# Patient Record
Sex: MALE | Employment: OTHER | URBAN - METROPOLITAN AREA
[De-identification: names, ages, dates, MRNs, and addresses within clinical notes are randomized per-mention and may not be internally consistent; named-entity substitution may affect disease eponyms.]

---

## 2018-12-05 ENCOUNTER — HOSPITAL ENCOUNTER (OUTPATIENT)
Age: 33
Discharge: HOME | End: 2018-12-05
Attending: FAMILY MEDICINE
Payer: OTHER GOVERNMENT

## 2018-12-05 DIAGNOSIS — M54.50 LOW BACK PAIN: ICD-10-CM

## 2018-12-05 PROCEDURE — 72148 MRI LUMBAR SPINE W/O DYE: CPT

## 2019-01-28 ENCOUNTER — OFFICE VISIT (OUTPATIENT)
Dept: ORTHOPEDIC SURGERY | Age: 34
End: 2019-01-28

## 2019-01-28 VITALS
DIASTOLIC BLOOD PRESSURE: 84 MMHG | HEART RATE: 80 BPM | WEIGHT: 200 LBS | SYSTOLIC BLOOD PRESSURE: 129 MMHG | HEIGHT: 67 IN | BODY MASS INDEX: 31.39 KG/M2

## 2019-01-28 DIAGNOSIS — M54.59 MECHANICAL LOW BACK PAIN: Primary | ICD-10-CM

## 2019-01-28 DIAGNOSIS — M79.18 MYOFASCIAL PAIN: ICD-10-CM

## 2019-01-28 NOTE — PATIENT INSTRUCTIONS
Back Stretches: Exercises  Your Care Instructions  Here are some examples of exercises for stretching your back. Start each exercise slowly. Ease off the exercise if you start to have pain. Your doctor or physical therapist will tell you when you can start these exercises and which ones will work best for you. How to do the exercises  Overhead stretch    1. Stand comfortably with your feet shoulder-width apart. 2. Looking straight ahead, raise both arms over your head and reach toward the ceiling. Do not allow your head to tilt back. 3. Hold for 15 to 30 seconds, then lower your arms to your sides. 4. Repeat 2 to 4 times. Side stretch    1. Stand comfortably with your feet shoulder-width apart. 2. Raise one arm over your head, and then lean to the other side. 3. Slide your hand down your leg as you let the weight of your arm gently stretch your side muscles. Hold for 15 to 30 seconds. 4. Repeat 2 to 4 times on each side. Press-up    1. Lie on your stomach, supporting your body with your forearms. 2. Press your elbows down into the floor to raise your upper back. As you do this, relax your stomach muscles and allow your back to arch without using your back muscles. As your press up, do not let your hips or pelvis come off the floor. 3. Hold for 15 to 30 seconds, then relax. 4. Repeat 2 to 4 times. Relax and rest    1. Lie on your back with a rolled towel under your neck and a pillow under your knees. Extend your arms comfortably to your sides. 2. Relax and breathe normally. 3. Remain in this position for about 10 minutes. 4. If you can, do this 2 or 3 times each day. Follow-up care is a key part of your treatment and safety. Be sure to make and go to all appointments, and call your doctor if you are having problems. It's also a good idea to know your test results and keep a list of the medicines you take. Where can you learn more?   Go to http://yesenia-handy.info/. Enter W093 in the search box to learn more about \"Back Stretches: Exercises. \"  Current as of: September 20, 2018  Content Version: 11.9  © 9398-3130 [employer]. Care instructions adapted under license by [employer] (which disclaims liability or warranty for this information). If you have questions about a medical condition or this instruction, always ask your healthcare professional. [address] any warranty or liability for your use of this information. Therapeutic Ball: Back Exercises  Your Care Instructions  Here are some examples of typical exercises for your condition. Start each exercise slowly. Ease off the exercise if you start to have pain. Your doctor or physical therapist will tell you when you can start these exercises and which ones will work best for you. To prepare, make sure that your ball is the right size for you. When inflated and firm, it should allow you to sit with your hips and knees bent at about a 90-degree angle (like the letter L). How to do the exercises  Seated position on ball    1. Use this exercise to get used to moving on the ball and to find your best sitting position. 2. Sit comfortably on the ball with your feet about hip-width apart. If you feel unsteady, rest your hands on the ball near your hips. 3. As you do this exercise, try to keep your shoulders and upper body relaxed and still. 4. Using your stomach and back muscles to move your pelvis, roll the ball forward. This will round your back. 5. Still using your stomach and back muscles, roll the ball back. You will arch your back. 6. Repeat this rounding-arching motion a few times. 7. Stop in between the two positions, where your back is not rounded or arched. This is called your neutral position. Pelvic rotation    1. Sit tall on the ball. 2. Slowly rotate your hips in a [place] pattern.  Keep the movement focused at your hips. 3. Repeat, but [place] in the other direction. 4. Repeat 8 to 12 times. Postural sitting    1. Use this position to find a stable, relaxed posture on the ball. You can use this position as your starting point for other ball exercises. If you feel unsteady on the ball, start on a chair first.  2. Sit on a ball or chair, with your feet planted straight in front of you. 3. Imagine that a string at the top of your head is pulling you straight up. Think of yourself as 2 inches taller than you are.  4. Slightly tuck your chin. 5. Keep your shoulders back and relaxed. Knee extension    1. Sit tall on the ball with your feet planted in front of you, hip-width apart. As you do this exercise, avoid slumping your shoulders and arching your back. 2. Rest your hands on the ball near your hip or a steady object next to you. (If you feel very stable on the ball, rest your hands in your lap or at your side.)  3. Slowly straighten one leg at the knee. Slowly lower it back down. Repeat with the other leg. 4. Repeat this exercise 8 to 12 times. Roll-ups    1. Lie on your back with your knees bent, feet resting on the floor. 2. Lay the ball on your thighs. Rest your hands up high on the ball. 3. Raising your head and shoulder blades, roll the ball up your thighs. Exhale as you roll up. 4. If this is hard on your neck, gently support your lower head and upper neck with one hand. Don't use that hand to pull your head up. 5. Repeat 8 to 12 times. Ball curls    1. Lie on your back with your ankles resting on the ball, knees straight. 2. Use your legs to roll the exercise ball toward you. Allow your knees to bend and move closer to your chest.  3. Pause briefly, and then roll the ball to the starting position. Try to keep the ball rolling straight. You will feel the muscles in your lower belly working. 4. Repeat 8 to 12 times. Bridge with ball under legs    1.  Lie on your back with your legs up, calves resting on the ball. For more challenge, rest your heels on the ball. 2. Look up at the ceiling, and keep your chin relaxed. You can place a small pillow under your head or neck for comfort. 3. With your arms by your side, press your hands onto the floor for stability. 4. Tighten your belly muscles by pulling in your belly button toward your spine. 5. Push your heels down toward the floor, squeeze your buttocks, and lift your hips off the floor until your shoulders, hips, and knees are all in a straight line. 6. Try to keep the ball steady. Hold for about 6 seconds as you continue to breathe normally. 7. Slowly lower your hips back down to the floor. 8. Repeat 8 to 12 times. Ball curls with bridge    1. Start flat on your back with your ankles resting on the ball. 2. Look up at the ceiling, and keep your chin relaxed. You can place a small pillow under your head or neck for comfort. 3. With your arms by your side, press your hands onto the floor for stability. 4. Tighten your belly muscles by pulling in your belly button toward your spine. 5. Push your heels down toward the floor, squeeze your buttocks, and lift your hips off the floor until your shoulders, hips, and knees are all in a straight line. 6. While holding the bridge position, roll the ball toward you with your heels. Keep your hips as level as you can. 7. Pause briefly, and then roll the ball back out. Try to keep the ball rolling straight. You will feel the muscles in your lower belly working as you straighten your legs. 8. Lower your hips, and return to your starting position. 9. Repeat 8 to 12 times. 10. When you can keep your body and the ball steady throughout this exercise, you're ready for more challenge. Try keeping your hips raised while rolling the ball out, holding the bridge, and rolling back, a few times in a row. Praying troy    1. Kneel upright with the ball in front of you.   2. To start, clasp your hands together. Rest them on the ball in front of you. 3. As you do this exercise, keep your back and hips straight and tighten your belly and buttocks muscles. Keep your knees in place. 4. Press on the ball with your arms. Lean forward from the knees. This rolls the ball forward. You will bear most of your weight on your arms. 5. If your back starts to ache, you've gone too far. Pull back a bit. 6. Roll back to the start position. 7. Repeat 8 to 12 times. Walk-out plank on ball    1. Kneel over the ball. Place your hands on the floor in front of you. 2. Walk your hands forward until your legs are straight on the ball. This is the plank position. 3. When in plank position, hold your body straight and tighten your belly and buttocks muscles. Keep your chin slightly tucked. 4. Roll as far forward as you can without losing your balance or letting your hips drop. You may stop with the ball under your thighs, or even under your knees or shins. 5. Hold a few seconds, then walk your hands back and return to the start position. 6. Repeat 8 to 12 times. Push-up with thighs on ball    1. Kneel over the ball. Place your hands on the floor in front of you. 2. Walk your hands forward until your legs are straight on the ball. This is the plank position. 3. When in plank position, hold your body straight and tighten your belly and buttocks muscles. Keep your chin slightly tucked. 4. Roll as far forward as you can without losing your balance or letting your hips drop. You may stop with the ball under your thighs, or even under your knees or shins. 5. Bend your elbows. Slowly lower your body toward the ground as far as you can without losing your balance. 6. If your wrists hurt, try moving your hands a little farther apart so they're not right under your shoulders. 7. Slowly straighten your arms. 8. Do 8 to 12 of these push-ups. Wall squat with ball    1. Stand facing away from a wall.  Place your feet about shoulder-width apart. 2. Place the ball between your middle back and the wall. Move your feet out in front of you so they are about a foot in front of your hips. 3. Keep your arms at your sides, or put your hands on your hips. 4. Slowly squat down as if you are going to sit in a chair, rolling your back over the ball as you squat. The ball should move with you but stay pressed into the wall. 5. Be sure that your knees do not go in front of your toes as you squat. 6. Hold for 6 seconds. 7. Slowly rise to your standing position. 8. Repeat 8 to 12 times. Child's pose with ball    1. Kneeling upright with your back straight, rest your hands on the ball in front of you. 2. Breathe out as you bend at the hips, and roll the ball forward. Lower your chest toward the ground, and drop your hips back toward your heels. 3. To stretch your upper back and shoulders, hold this position for 15 to 30 seconds. 4. Repeat 2 to 4 times. Follow-up care is a key part of your treatment and safety. Be sure to make and go to all appointments, and call your doctor if you are having problems. It's also a good idea to know your test results and keep a list of the medicines you take. Where can you learn more? Go to http://yesenia-handy.info/. Enter M117 in the search box to learn more about \"Therapeutic Ball: Back Exercises. \"  Current as of: September 20, 2018  Content Version: 11.9  © 7711-7821 [employer]. Care instructions adapted under license by [employer] (which disclaims liability or warranty for this information). If you have questions about a medical condition or this instruction, always ask your healthcare professional. [address] any warranty or liability for your use of this information. Low Back Pain: Exercises  Your Care Instructions  Here are some examples of typical rehabilitation exercises for your condition. Start each exercise slowly. Ease off the exercise if you start to have pain. Your doctor or physical therapist will tell you when you can start these exercises and which ones will work best for you. How to do the exercises  Press-up    1. Lie on your stomach, supporting your body with your forearms. 2. Press your elbows down into the floor to raise your upper back. As you do this, relax your stomach muscles and allow your back to arch without using your back muscles. As your press up, do not let your hips or pelvis come off the floor. 3. Hold for 15 to 30 seconds, then relax. 4. Repeat 2 to 4 times. Alternate arm and leg (bird dog) exercise    1. Start on the floor, on your hands and knees. 2. Tighten your belly muscles. 3. Raise one leg off the floor, and hold it straight out behind you. Be careful not to let your hip drop down, because that will twist your trunk. 4. Hold for about 6 seconds, then lower your leg and switch to the other leg. 5. Repeat 8 to 12 times on each leg. 6. Over time, work up to holding for 10 to 30 seconds each time. 7. If you feel stable and secure with your leg raised, try raising the opposite arm straight out in front of you at the same time. Knee-to-chest exercise    1. Lie on your back with your knees bent and your feet flat on the floor. 2. Bring one knee to your chest, keeping the other foot flat on the floor (or keeping the other leg straight, whichever feels better on your lower back). 3. Keep your lower back pressed to the floor. Hold for at least 15 to 30 seconds. 4. Relax, and lower the knee to the starting position. 5. Repeat with the other leg. Repeat 2 to 4 times with each leg. 6. To get more stretch, put your other leg flat on the floor while pulling your knee to your chest.    Curl-ups    1. Lie on the floor on your back with your knees bent at a 90-degree angle. Your feet should be flat on the floor, about 12 inches from your buttocks.   2. Cross your arms over your chest. If this bothers your neck, try putting your hands behind your neck (not your head), with your elbows spread apart. 3. Slowly tighten your belly muscles and raise your shoulder blades off the floor. 4. Keep your head in line with your body, and do not press your chin to your chest.  5. Hold this position for 1 or 2 seconds, then slowly lower yourself back down to the floor. 6. Repeat 8 to 12 times. Pelvic tilt exercise    1. Lie on your back with your knees bent. 2. \"Brace\" your stomach. This means to tighten your muscles by pulling in and imagining your belly button moving toward your spine. You should feel like your back is pressing to the floor and your hips and pelvis are rocking back. 3. Hold for about 6 seconds while you breathe smoothly. 4. Repeat 8 to 12 times. Heel dig bridging    1. Lie on your back with both knees bent and your ankles bent so that only your heels are digging into the floor. Your knees should be bent about 90 degrees. 2. Then push your heels into the floor, squeeze your buttocks, and lift your hips off the floor until your shoulders, hips, and knees are all in a straight line. 3. Hold for about 6 seconds as you continue to breathe normally, and then slowly lower your hips back down to the floor and rest for up to 10 seconds. 4. Do 8 to 12 repetitions. Hamstring stretch in doorway    1. Lie on your back in a doorway, with one leg through the open door. 2. Slide your leg up the wall to straighten your knee. You should feel a gentle stretch down the back of your leg. 3. Hold the stretch for at least 15 to 30 seconds. Do not arch your back, point your toes, or bend either knee. Keep one heel touching the floor and the other heel touching the wall. 4. Repeat with your other leg. 5. Do 2 to 4 times for each leg. Hip flexor stretch    1. Kneel on the floor with one knee bent and one leg behind you. Place your forward knee over your foot.  Keep your other knee touching the floor.  2. Slowly push your hips forward until you feel a stretch in the upper thigh of your rear leg. 3. Hold the stretch for at least 15 to 30 seconds. Repeat with your other leg. 4. Do 2 to 4 times on each side. Wall sit    1. Stand with your back 10 to 12 inches away from a wall. 2. Lean into the wall until your back is flat against it. 3. Slowly slide down until your knees are slightly bent, pressing your lower back into the wall. 4. Hold for about 6 seconds, then slide back up the wall. 5. Repeat 8 to 12 times. Follow-up care is a key part of your treatment and safety. Be sure to make and go to all appointments, and call your doctor if you are having problems. It's also a good idea to know your test results and keep a list of the medicines you take. Where can you learn more? Go to http://yesenia-handy.info/. Enter Z441 in the search box to learn more about \"Low Back Pain: Exercises. \"  Current as of: September 20, 2018  Content Version: 11.9  © 4811-6368 [employer]. Care instructions adapted under license by [employer] (which disclaims liability or warranty for this information). If you have questions about a medical condition or this instruction, always ask your healthcare professional. [address] any warranty or liability for your use of this information.

## 2019-01-28 NOTE — PROGRESS NOTES
[address]  [address], [address]  [place]  Phone: [phone]  Fax: [phone]        Josi Peters  : 1985  PCP: Yogi Jeter MD  2019    NEW PATIENT      ASSESSMENT AND PLAN     Ken Payne comes in to the office today c/o low back pain following a fall at work when he slipped on some ice. He denies any radicular symptoms. He has been in PT for the last 5 weeks ([facility]), and he is unsure as to whether it has been beneficial. He rates his pain as a 0/10 today. His lumbar MRI revealed ambiguous segmentation with partially lumbarized L5. At the L5-S1 transitional level, there is mild left foraminal stenosis related to bony prominence of the left L5-S1 lateral mass psuedoarticulation complex. On examination, he maintains strength and sensation. He had tenderness to palpation of his lumbar paraspinals. He had no directional preference. His pain is likely myofascial in nature. I referred to PT with dry needling and pilates equipment based therapy. Pt will f/u in 6 weeks or sooner if needed. Diagnoses and all orders for this visit:    1. Mechanical low back pain  -     REFERRAL TO PHYSICAL THERAPY    2. Myofascial pain  -     REFERRAL TO PHYSICAL THERAPY      Follow-up Disposition: Not on File    CHIEF COMPLAINT  Ken Payne is seen today in consultation at the request of Other, MD Joseph for complaints of low back pain. HISTORY OF PRESENT ILLNESS  Ken Payne is a 35 y.o. male c/o low back pain. He works in the [employer]. He reports that he was walking outside of work one day (2018), and he slipped on some ice landing on his back. He reports that he has had low back pain since.  He is currently in PT ([facility] with Jerardo Denny) for about 5 weeks, and he is unsure whether it has been beneficial. His pain is worse after prolonged standing or sitting, but is alleviated with walking or moving. He is still able to run without pain. He continues to do the [employer] workout series, but it exacerbates his pain some. He has been using ice as a modality. Pt denies any fevers, chills, nausea, vomiting. Pt denies any chest pain and shortness of breath. Pt denies any ear, nose, and throat problems. Pt denies any fecal or urinary incontinence. PAST MEDICAL HISTORY   Past Medical History:   Diagnosis Date    Lactose intolerance        History reviewed. No pertinent surgical history. MEDICATIONS        ALLERGIES    Allergies   Allergen Reactions    Lactose Other (comments)     intolerance          SOCIAL HISTORY    Social History     Socioeconomic History    Marital status:      Spouse name: Not on file    Number of children: Not on file    Years of education: Not on file    Highest education level: Not on file       FAMILY HISTORY  History reviewed. No pertinent family history. REVIEW OF SYSTEMS  Review of Systems   Musculoskeletal: Positive for back pain. PHYSICAL EXAMINATION  Visit Vitals  /84   Pulse 80   Ht 5' 7\" (1.702 m)   Wt 200 lb (90.7 kg)   BMI 31.32 kg/m²         Pain Assessment  1/28/2019   Location of Pain Back   Severity of Pain 0   Quality of Pain Aching   Frequency of Pain Intermittent   Aggravating Factors Standing;Bending   Limiting Behavior No   Relieving Factors Rest   Result of Injury No         Constitutional:  Well developed, well nourished, in no acute distress. Psychiatric: Affect and mood are appropriate. HEENT: Normocephalic, atraumatic. Extraocular movements intact. Integumentary: No rashes or abrasions noted on exposed areas. Cardiovascular: Regular rate and rhythm. Pulmonary: Clear to auscultation bilaterally. SPINE/MUSCULOSKELETAL EXAM    Cervical spine:  Neck is midline. Normal muscle tone. No focal atrophy is noted. ROM pain free. Shoulder ROM intact. No tenderness to palpation. Negative Spurling's sign. Negative Tinel's sign. Negative Farah's sign. Sensation in the bilateral arms grossly intact to light touch. Lumbar spine:  No rash, ecchymosis, or gross obliquity. No fasciculations. No focal atrophy is noted. No pain with hip ROM. Full range of motion. Tenderness to palpation of lumbar paraspinals. No tenderness to palpation at the sciatic notch. SI joints non-tender. Trochanters non tender. Sensation in the bilateral legs grossly intact to light touch. No directional preference. MOTOR:      Biceps  Triceps Deltoids Wrist Ext Wrist Flex Hand Intrin   Right 5/5 5/5 5/5 5/5 5/5 5/5   Left 5/5 5/5 5/5 5/5 5/5 5/5             Hip Flex  Quads Hamstrings Ankle DF EHL Ankle PF   Right 5/5 5/5 5/5 5/5 5/5 5/5   Left 5/5 5/5 5/5 5/5 5/5 5/5     DTRs are 2+ biceps, triceps, brachioradialis, patella, and Achilles. Negative Straight Leg raise. Squat not tested. No difficulty with tandem gait. Ambulation without assistive device. FWB. RADIOGRAPHS  Lumbar MRI images taken on 12/5/18 personally reviewed with patient:  FINDINGS:     General: Ambiguous vertebral segmentation. For interpretation purposes, L5 will  be considered partially sacralized, left more than right with pseudoarticulation  of the left L5-S1 lateral masses. Vertebral body heights are preserved. Above  L5, disc space heights are preserved. No focal listhesis. Lumbar lordosis  maintained. Conus ends at level L1. No abnormal signal in the distal cord. No  abnormal epidural collection identified. No suspicious marrow signal.     Miscellaneous: No incidental acute or suspicious findings outside of the lumbar  spine.     Levels:     T11-T12: Evaluated sagittal plane only.  No significant degenerative change or  stenosis.     T12-L1: No significant degenerative change or stenosis.     L1-L2: No significant degenerative change or stenosis.     L2-L3: No significant degenerative change or stenosis.     L3-L4: No significant degenerative change or stenosis.     L4-L5: Minimal disc bulge. Mild facet arthropathy. Spinal canal patent. Minimal  foraminal narrowing.     L5-S1: Transitional level. No significant disc disease. Spinal canal patent. Mild left foraminal stenosis related to bony prominence of the left L5-S1  lateral mass pseudoarticulation. Right foramen patent.     Imaged sacrum: Unremarkable.     IMPRESSION  IMPRESSION:     1. No acute findings. No critical stenosis. Overall mild degenerative changes  detailed level by level above.     2. Ambiguous vertebral segmentation. L5 is considered partially lumbarized for  interpretation purposes. -At the L5-S1 transitional level there is mild left foraminal stenosis related  to bony prominence of the left L5-S1 lateral mass pseudoarticulation complex.  reviewed    Mr. Terry Kaplan has a reminder for a \"due or due soon\" health maintenance. I have asked that he contact his primary care provider for follow-up on this health maintenance. 30 minutes of face-to-face contact were spent with the patient during today's visit extensively discussing symptoms and treatment plan. All questions were answered. More than half of this visit today was spent on counseling. Written by Rk Cooper, as dictated by Dr. Marty Yip. I, Dr. Maryt Yip, confirm that all documentation is accurate.

## 2019-02-11 ENCOUNTER — HOSPITAL ENCOUNTER (OUTPATIENT)
Dept: PHYSICAL THERAPY | Age: 34
Discharge: HOME | End: 2019-02-11
Payer: OTHER GOVERNMENT

## 2019-02-11 PROCEDURE — 97535 SELF CARE MNGMENT TRAINING: CPT

## 2019-02-11 PROCEDURE — 97161 PT EVAL LOW COMPLEX 20 MIN: CPT

## 2019-02-11 PROCEDURE — 97110 THERAPEUTIC EXERCISES: CPT

## 2019-02-11 NOTE — PROGRESS NOTES
PT DAILY TREATMENT NOTE/LUMBAR EVAL 10-18 Patient Name: Juanjo Link Date:2019 : 1985 [x]  Patient  Verified Payor: MARCOS / Plan: Nguyễn Valerio 74 / Product Type: Shawna Oconnor / In time:2:05pm  Out time:2:43pm 
Total Treatment Time (min): 38 Visit #: 1 of 8 Medicare/BCBS Only Total Timed Codes (min):  20 1:1 Treatment Time:  45 Treatment Area: Low back pain [M54.5] SUBJECTIVE Pain Level (0-10 scale): 0 
[]constant [x]intermittent []improving []worsening []no change since onset Any medication changes, allergies to medications, adverse drug reactions, diagnosis change, or new procedure performed?: [x] No    [] Yes (see summary sheet for update) Subjective functional status/changes:    
Pt reports onset of back pain in 2018 after a slip and fall on ice. He reports he first f/u for treatment in 2018 d/t c/o continued intermittent back spasms and pain with prolonged static positioning, bending over sometimes such as when doing dishes, and when performing more intense workouts that involved jumping. He reports 5 weeks of PT treatment recently at another clinic that he feels did little to change his symptoms and \"felt too easy\". PLOF: exercising, lifting, running regularly without limitations Limitations to PLOF: continues to participate in exercise, but some limitations with harder workouts and running/jumping 2/2 back pain, pain with prolonged bending Mechanism of Injury: see above Current symptoms/Complaints: see above Previous Treatment/Compliance: PT 5 weeks, spine specialist referral 
PMHx/Surgical Hx: unremarkable Work Hx: see intake Living Situation:  
Pt Goals: see intake Barriers: []pain []financial []time []transportation []other Motivation: appears motivated Substance use: []Alcohol []Tobacco []other:  
FABQ Score: []low []elevate Cognition: A & O x 4    Other: OBJECTIVE/EXAMINATION Domestic Life: Activity/Recreational Limitations:  
Mobility:  
Self Care:  
 
Eval: 18 minutes 12 min Therapeutic Exercise:  [x] See flow sheet :  
Rationale: increase ROM and increase strength to improve the patients ability to improve ease of ADLs. Self Care: 8 minutes pt education regarding relevant anatomy, diagnosis, prognosis, plan of care, activity modification, self modality use to facilitate pt self management. With 
 [] TE 
 [] TA 
 [] neuro 
 [] other: Patient Education: [x] Review HEP [] Progressed/Changed HEP based on:  
[] positioning   [] body mechanics   [] transfers   [] heat/ice application   
[] other:   
 
Other Objective/Functional Measures: 
 
Physical Therapy Evaluation - Lumbar Spine ([facility]) SUBJECTIVE Chief Complaint: 
 
Mechanism of injury: 
 
Symptoms: 
Aggravated by: 
 [x] Bending [] Sitting [] Standing [] Walking 
 [] Moving [] Cough [] Sneeze [] Valsalva 
 [] AM  [] PM  Lying:  [] sup   [] pro   [] sidelying 
 [x] Other: running, jumping Eased by:  
 [] Bending [] Sitting [] Standing [] Walking 
 [] Moving [] AM  [] PM  Lying: [] sup  [] pro  [] sidelying 
 [] Other:   
  
General Health:  Red Flags Indicated? [] Yes    [x] No 
[] Yes [] No Recent weight change (If yes, due to dieting? [] Yes  [] No) [] Yes [] No Weakness in legs during walking 
[] Yes [] No Unremitting pain at night 
[] Yes [] No Abdominal pain or problems 
[] Yes [] No Rectal bleeding 
[] Yes [] No Feet more cold or painful in cold weather 
[] Yes [] No Menstrual irregularities 
[] Yes [] No Blood or pain with urination 
[] Yes [] No Dysfunction of bowel or bladder 
[] Yes [] No Recent illness within past 3 weeks (i.e, cold, flu) 
[] Yes [] No Numbness/tingling in buttock/genitalia region Past History/Treatments:  
 
Diagnostic Tests: [] Lab work [] X-rays    [] CT [x] MRI     [] Other: 
Results: see results in Epic, unremarkable Functional Status Prior level of function: Present functional limitations: 
What position do you sleep in?: 
 
OBJECTIVEPosture: 
Lateral Shift: [] R    [] L     [] +  [] -   
Kyphosis: [] Increased [] Decreased   []  WNL Lordosis:  [] Increased [] Decreased   [] WNL Pelvic symmetry: [] WNL    [] Other: 
 
Gait:  [x] Normal     [] Abnormal: 
 
Active Movements: [] N/A   [] Too acute   [] Other: ROM % AROM % PROM Comments:pain, area Forward flexion 40-60 Fingers to toes  Minimal pain if flexion held in mid range Extension 20-30 WNL  Minimal LBP at end range SB right 20-30 WNL    
SB left 20-30 WNL Rotation right 5-10 WNL Rotation left 5-10 WNL Repeated Movements Effects on present pain: produces (NY), abolishes (A), increases (incr), decreases (decr), centralizes (C), peripheral (PH), no effect (NE) Pre-Test Sx Flexion Repeated Flexion Extension Repeated Extension Repeated SBL Repeated SBR Sitting Standing Lying      N/A N/A Comments: 
Side Glide: 
Sustained passive positioning test: 
 
Neuro Screen [x] WNL Myotome/Dermatome/Reflexes: 
Comments: 
 
Dural Mobility: SLR Sitting: [] R    [] L    [] +    [] -  @ (degrees):  
        Supine: [] R    [] L    [] +    [x] -  @ (degrees):  
Slump Test: [] R    [] L    [] +    [x] -  @ (degrees): Prone Knee Bend: [] R    [] L    [] +    [x] - Palpation 
[x] Min  [] Mod  [] Severe    Location: R thoracolumbar paraspinals from ~T10-L4 [] Min  [] Mod  [] Severe    Location: 
[] Min  [] Mod  [] Severe    Location: 
 
Strength 
 L(0-5) R (0-5) N/T Hip Flexion (L1,2) 5 5 [] Knee Extension (L3,4) 5 5 [] Ankle Dorsiflexion (L4) 5 5 [] Great Toe Extension (L5) 5 5 [] Ankle Plantarflexion (S1) 5 5 [] Knee Flexion (S1,2) 5 5 [] Upper Abdominals   [] Lower Abdominals   [] Paraspinals 4 4 [] Back Rotators   [] Gluteus Fidencio 4 4 [] Other   [] Special Tests Lumbar:  Lumb.  Compression: [] Pos  [] Neg            
 Lumbar Distraction:   [] Pos  [] Neg 
  Quadrant:  [] Pos  [] Neg   [] Flex  [] Ext Sacroilliac:  Gaenslen's: [] R    [] L    [] +    [] -  
  Compression: [] +    [x] - Gapping:  [] +    [x] - Thigh Thrust: [] R    [] L    [] +    [] - Leg Length: [] +    [x] -   Position: 
  Crests:  level ASIS: level PSIS: level Sacral Sulcus: 
  Mobility: Standing flex: 
   Sitting flex: 
   Supine to sit: 
   Prone knee bend: 
 
     Hip: Huyen Chanell:  [x] R    [x] L    [x] +    [] - limited hip mobility bilat, no back pain Scour:  [] R    [] L    [] +    [x] - Piriformis: [] R    [] L    [] +    [x] - Deficits: Kyle's: [] R    [] L    [] +    [x] - William: [x] R    [x] L    [x] +    [] - Hamstrings 90/90: left -25, right -15 Gastrocsoleus (to neutral): Right: Left: 
    
Global Muscular Weakness: 
Abdominals: 
Quadratus Lumborum: 
Paraspinals: Other: 
 
Other tests/comments: 
  
 
Pain Level (0-10 scale) post treatment: 0 
 
ASSESSMENT/Changes in Function: Per POC. Patient will continue to benefit from skilled PT services to modify and progress therapeutic interventions, address ROM deficits, analyze and address soft tissue restrictions, analyze and cue movement patterns and instruct in home and community integration to attain remaining goals. []  See Plan of Care 
[]  See progress note/recertification 
[]  See Discharge Summary Progress towards goals / Updated goals: 
Per POC. PLAN 
[]  Upgrade activities as tolerated     []  Continue plan of care 
[]  Update interventions per flow sheet      
[]  Discharge due to:_ 
[x]  Other:_DN & myofascial interventions, trunk mobility, hip extension mobility Kaycee Kevin, PT, DPT, ATC 2/11/2019  2:10 PM

## 2019-02-12 NOTE — PROGRESS NOTES
In [address] [place] [address] [address] [place], [address] 
[phone] fax Plan of Care/ Statement of Necessity for Physical Therapy Services Patient name: Soto Alicea Start of Care: 2019 Referral source: Samuel Henderson MD : 1985 Medical Diagnosis: Low back pain [M54.5] Payor:  / Plan: Reji Zazueta / Product Type:  /  Onset Date: 2018 Treatment Diagnosis: mechanical LBP Prior Hospitalization: see medical history Provider#: [id number] Medications: Verified on Patient summary List  
 Comorbidities: unremarkable Prior Level of Function:  exercising, lifting, running regularly without limitations Limitations to PLOF: continues to participate in exercise, but some limitations with harder workouts and running/jumping 2/2 back pain The Plan of Care and following information is based on the information from the initial evaluation. Assessment/ key information: Pt is a 35year old male who . He reports he first f/u for treatment in 2018 d/t c/o continued intermittent back spasms and pain with prolonged static positioning, bending over sometimes such as when doing dishes, and when performing more intense workouts that involved jumping. He reports 5 weeks of PT treatment recently at another clinic that he feels did little to change his symptoms and \"felt too easy\". He presents with possible indications of mechanically mediated LBP with mild limitations in hip extension mobility, TTP right lumbar paraspinals, and reported pain with prolonged bending. Pt will benefit from skilled PT management to address their impairments and improve their level of function.  
 
Evaluation Complexity History LOW Complexity : Zero comorbidities / personal factors that will impact the outcome / POC; Examination LOW Complexity : 1-2 Standardized tests and measures addressing body structure, function, activity limitation and / or participation in recreation  ;Presentation LOW Complexity : Stable, uncomplicated  ;Clinical Decision Making MEDIUM Complexity : FOTO score of 26-74 Overall Complexity Rating: LOW Problem List: pain affecting function, decrease ROM, decrease activity tolerance and decrease flexibility/ joint mobility Treatment Plan may include any combination of the following: Therapeutic exercise, Therapeutic activities, Neuromuscular re-education, Physical agent/modality, Manual therapy, Patient education and Self Care training Patient / Family readiness to learn indicated by: asking questions, trying to perform skills and interest 
Persons(s) to be included in education: patient (P) Barriers to Learning/Limitations: None Patient Goal (s): Pain free.  Patient Self Reported Health Status: good Rehabilitation Potential: fair Short Term Goals: To be accomplished in 2 weeks: 1. Patient will report performance of HEP at least 2 times per day to facilitate improved outcomes and improved self management. Long Term Goals: To be accomplished in 4 weeks: 1. Patient will report FOTO score of 81 or better to indicate significant improvement in functional status. 2. Pt will demonstrate full trunk flexion void of pain to improve ease of daily activity. 3. Pt will report ability to return to running void of pain to improve QOL. Frequency / Duration: Patient to be seen 2 times per week for 4 weeks. Patient/ Caregiver education and instruction: Diagnosis, prognosis, self care, activity modification and exercises 
 [x]  Plan of care has been reviewed with TRACY Levin PT, DPT, ATC 2/11/2019 7:31 PM 
 
________________________________________________________________________ I certify that the above Therapy Services are being furnished while the patient is under my care. I agree with the treatment plan and certify that this therapy is necessary. [de-identified] Signature:____________Date:_________TIME:________ 
 
[employer], Date and Time must be completed for valid certification ** Please sign and return to In [address] [place], [address] 
[phone] fax

## 2019-02-13 ENCOUNTER — HOSPITAL ENCOUNTER (OUTPATIENT)
Dept: PHYSICAL THERAPY | Age: 34
Discharge: HOME | End: 2019-02-13
Payer: OTHER GOVERNMENT

## 2019-02-13 PROCEDURE — 97140 MANUAL THERAPY 1/> REGIONS: CPT

## 2019-02-13 PROCEDURE — 97110 THERAPEUTIC EXERCISES: CPT

## 2019-02-13 NOTE — PROGRESS NOTES
PT DAILY TREATMENT NOTE 10-18 Patient Name: Zeferino Lam Date:2019 : 1985 [x]  Patient  Verified Payor: MARCOS / Plan: Saima Calhoun / Product Type: Izaiah Alto / In time:4:58  Out time:5:37 Total Treatment Time (min): 39 Visit #: 2 of 8 Treatment Area: Low back pain [M54.5] SUBJECTIVE Pain Level (0-10 scale): 2 Any medication changes, allergies to medications, adverse drug reactions, diagnosis change, or new procedure performed?: [x] No    [] Yes (see summary sheet for update) Subjective functional status/changes:   [] No changes reported Pt reports he is tight today. OBJECTIVE Modality rationale:   
Min Type Additional Details  
 [] Estim:  []Unatt       []IFC  []Premod []Other:  []w/ice   []w/heat Position: Location:  
 [] Estim: []Att    []TENS instruct  []NMES []Other:  []w/US   []w/ice   []w/heat Position: Location:  
 []  Traction: [] Cervical       []Lumbar 
                     [] Prone          []Supine []Intermittent   []Continuous Lbs: 
[] before manual 
[] after manual  
 []  Ultrasound: []Continuous   [] Pulsed []1MHz   []3MHz W/cm2: 
Location:  
 []  Iontophoresis with dexamethasone Location: [] Take home patch  
[] In clinic  
 []  Ice     []  heat 
[]  Ice massage 
[]  Laser  
[]  Anodyne Position: Location:  
 []  Laser with stim 
[]  Other:  Position: Location:  
 []  Vasopneumatic Device Pressure:       [] lo [] med [] hi  
Temperature: [] lo [] med [] hi  
[] Skin assessment post-treatment:  []intact []redness- no adverse reaction 
  []redness  adverse reaction:  
 
31 min Therapeutic Exercise:  [x] See flow sheet :  
Rationale: increase ROM and increase strength to improve the patients ability to perform functional tasks 8 min Manual Therapy:  Ayse with GT4 to right lumbar paraspinals and QL  
 Rationale: decrease pain, increase ROM and increase tissue extensibility to improve functional mobility With 
 [] TE 
 [] TA 
 [] neuro 
 [] other: Patient Education: [x] Review HEP [] Progressed/Changed HEP based on:  
[] positioning   [] body mechanics   [] transfers   [] heat/ice application   
[] other:   
 
Other Objective/Functional Measures:  Initiated treatment per flow sheet Pain Level (0-10 scale) post treatment: 0 
 
ASSESSMENT/Changes in Function: pt with diminished pain and improved mobility following treatment. + myofascial restriction noted QL/paraspinals. Patient will continue to benefit from skilled PT services to modify and progress therapeutic interventions, address functional mobility deficits, address ROM deficits, address strength deficits, analyze and address soft tissue restrictions, analyze and cue movement patterns and analyze and modify body mechanics/ergonomics to attain remaining goals. []  See Plan of Care 
[]  See progress note/recertification 
[]  See Discharge Summary Progress towards goals / Updated goals: 
Short Term Goals: To be accomplished in 2 weeks: 1. Patient will report performance of HEP at least 2 times per day to facilitate improved outcomes and improved self management. - reports compliance 2-13-19 
  
Long Term Goals: To be accomplished in 4 weeks: 1. Patient will report FOTO score of 81 or better to indicate significant improvement in functional status. 2. Pt will demonstrate full trunk flexion void of pain to improve ease of daily activity. 3. Pt will report ability to return to running void of pain to improve QOL. PLAN 
[]  Upgrade activities as tolerated     [x]  Continue plan of care 
[]  Update interventions per flow sheet      
[]  Discharge due to:_ 
[]  Other:_ Lavell Saravia, PT 2/13/2019  4:48 PM 
 
Future Appointments Date Time Provider Eric Farah 2/13/2019  5:00 PM Citlalli Rhodes, PT [facility]  
2/19/2019  5:30 PM Gerard, [address] [facility]  
2/21/2019  5:30 PM Catherne Loss, PT [facility]  
3/5/2019  4:30 PM Catherne Loss, PT [facility]  
3/7/2019  4:30 PM Gerard, [address] [facility]  
3/11/2019  3:30 PM Bg De Luna  [address]

## 2019-02-19 ENCOUNTER — HOSPITAL ENCOUNTER (OUTPATIENT)
Dept: PHYSICAL THERAPY | Age: 34
Discharge: HOME | End: 2019-02-19
Payer: OTHER GOVERNMENT

## 2019-02-19 PROCEDURE — 97110 THERAPEUTIC EXERCISES: CPT

## 2019-02-19 PROCEDURE — 97140 MANUAL THERAPY 1/> REGIONS: CPT

## 2019-02-19 PROCEDURE — 97535 SELF CARE MNGMENT TRAINING: CPT

## 2019-02-19 NOTE — PROGRESS NOTES
PT DAILY TREATMENT NOTE 12 Patient Name: Reji Lao Date:2019 : 1985 [x]  Patient  Verified Payor: MARCOS / Plan: Nguyễn Valerio 74 / Product Type: Killian Ellie / In time:5:30  Out time:6:20 Total Treatment Time (min): 50 Visit #: 3 of 8 Treatment Area: Low back pain [M54.5] SUBJECTIVE Pain Level (0-10 scale): 2-3 Any medication changes, allergies to medications, adverse drug reactions, diagnosis change, or new procedure performed?: [x] No    [] Yes (see summary sheet for update) Subjective functional status/changes:   [] No changes reported Pt reports that he is a bit nervous about DN today OBJECTIVE Modality rationale: PD to improve the patients ability to Min Type Additional Details  
 [] Estim:  []Unatt       []IFC  []Premod []Other:  []w/ice   []w/heat Position: Location:  
 [] Estim: []Att    []TENS instruct  []NMES []Other:  []w/US   []w/ice   []w/heat Position: Location:  
 []  Traction: [] Cervical       []Lumbar 
                     [] Prone          []Supine []Intermittent   []Continuous Lbs: 
[] before manual 
[] after manual  
 []  Ultrasound: []Continuous   [] Pulsed []1MHz   []3MHz W/cm2: 
Location:  
 []  Iontophoresis with dexamethasone Location: [] Take home patch  
[] In clinic  
 []  Ice     []  heat 
[]  Ice massage 
[]  Laser  
[]  Anodyne Position: Location:  
 []  Laser with stim 
[]  Other:  Position: Location:  
 []  Vasopneumatic Device Pressure:       [] lo [] med [] hi  
Temperature: [] lo [] med [] hi  
[] Skin assessment post-treatment:  []intact []redness- no adverse reaction 
  []redness  adverse reaction:  
 
 
27 min Therapeutic Exercise:  [] See flow sheet :   
Rationale: increase ROM and increase strength to improve the patients ability to perform daily tasks and work duties 15 min Therapeutic Activity:  []  See flow sheet :  
Rationale: self care and pt education  to improve the patients ability to successfully participate in DN and post needling care instruction 8 min Manual Therapy:  Palpation and setup for DN Rationale: increase ROM, increase tissue extensibility and decrease trigger points to perform ADLs with improved lumbopelvic mechanics Dry Needling Procedure Note Procedure: An intramuscular manual therapy (dry needling) and a neuro-muscular re-education treatment was done to deactivate myofascial trigger points with a 30 gauge filament needle under aseptic technique. Indications: 
[x] Myofascial pain and dysfunction [] Muscled spasms [] Myalgia/myositis   [] Muscle cramps [x] Muscle imbalances  [] Other: 
 
Chart reviewed for the following: 
Sabra Still, have reviewed the medical history, summary list and precautions/contraindications for [employer]. TIME OUT performed immediately prior to start of procedure: 
Nathan LARA, have performed the following reviews on [employer] prior to the start of the session:     
[x] Verified patient identification by name and date of birth   
[x] Agreement on all muscles being treated was verified  
[x] Purpose of dry needling, side effects, possible complications, risks and benefits were explained to the patient [x] Procedure site(s) verified 
[x] Patient was positioned for comfort and draped for privacy [x] Informed Consent was signed (initial visit) and verified verbally (subsequent visits) [x] Patient was instructed on the need to report the use of blood thinners and/or immunosuppressant medications [x] How to respond to possible adverse effects of treatment 
[x] Self treatment of post needling soreness: ice, heat (moist heat, heat wraps) and stretching 
[x] Opportunity was given to ask any questions, all questions were answered Time: 5:50 Date of procedure: 2/19/2019 Treatment: The following muscles were treated today with intramuscular dry needling 
[] Left [] Right Abdominals: Ant Rectus Abdominis 
[] Left [] Right External Oblique / Internal Oblique / Transverse Abdominis 
[] Left [] Right Thoracic Multifidi / Rotatores 
[] Left [x] Right Iliocostalis Thoracis / Lumborum 
[] Left [x] Right Longissimus Thoracis / Lumborum 
[] Left [] Right Lumbar Multifidi 
[] Left [] Right Serratus Posterior Inferior 
[] Left [] Right Quadratus Lumborum 
[] Left [] Right Psoas 
[] Left [] Right Iliacus 
[] Left [] Right Iliopsoas (inguinal) 
[] Left [] Right Piriformis 
[] Left [] Right Quadratus Femoris 
[] Left [] Right Chelsy Dry / Chalino Najjar / Darren Mirza 
[] Left [] Right Obturator Internus 
[] Left [] Right Obturator Externus / Samreen Ek / Inferior Gemellus 
 
[] Left [] Right Tensor Fasciae Lilli 
[] Left [] Right Iliotibial Band 
[] Left [] Right Pectineus 
[] Left [] Right Sartorius 
[] Left [] Right Gracilis 
[] Left [] Right Adductor Brevis / Adductor Longus / Adductor Elpidio 
[] Left [] Right Rectus Femoris / Vastus Lateralis / Vastus Intermedius / Vastus Medialis Obliquus 
[] Left [] Right Tibialis Anterior / Posterior 
[] Left [] Right Extensor Digitorum Longus / Brevis 
[] Left [] Right Extensor Hallucis Longus / Brevis 
[] Left [] Right Hamstrings: Biceps Femoris / Elpidio Sham / Semimembranosis 
[] Left [] Right Popliteus / Planteris 
[] Left [] Right Gastrocnemius Medial / Lateral 
[] Left [] Right Soleus 
[] Left [] Right Peronei: Longus / Amaya Schneiders / Tertius 
[] Left [] Right Flexor Digitorum Longus / Brevis 
[] Left [] Right Flexor Hallucis Longus / Brevis 
[] Left [] Right Quadratus Plantae 
[] Left [] Right Abductor Digiti Minimi 
[] Left [] Right Foot Interossei 
[] Left [] Right Other: 
 
Patient's response to today's treatment: 
[x] Latent Twitch Response  [] Muscle relaxation [] Pain Relief 
[x] Post needling soreness [x] without complications [] Increased Range of Motion 
[] Other:  
 
Performed by: Schuyler Spatz, BEV, LAYTONPT With 
 [] TE 
 [] TA 
 [] neuro 
 [] other: Patient Education: [x] Review HEP [] Progressed/Changed HEP based on:  
[] positioning   [] body mechanics   [] transfers   [] heat/ice application   
[] other:   
 
Other Objective/Functional Measures:   
 
Pain Level (0-10 scale) post treatment: 2-3 
 
ASSESSMENT/Changes in Function: Fair response to DN today, pt quite apprehensive and tense during needling which magnified feelings of soreness. Encouraged pt that first needling session is often the most difficult but if he was not interested in further needling that was OK. Patient will continue to benefit from skilled PT services to modify and progress therapeutic interventions, address functional mobility deficits, address ROM deficits, address strength deficits, analyze and address soft tissue restrictions, analyze and cue movement patterns and analyze and modify body mechanics/ergonomics to attain remaining goals. []  See Plan of Care 
[]  See progress note/recertification 
[]  See Discharge Summary Progress towards goals / Updated goals: 
Short Term Goals: To be accomplished in 2 weeks: 
             1. Patient will report performance of HEP at least 2 times per day to facilitate improved outcomes and improved self management. - reports compliance 2-13-19 
  
Long Term Goals: To be accomplished in 4 weeks: 
             1. Patient will report FOTO score of 81 or better to indicate significant improvement in functional status.              2. Pt will demonstrate full trunk flexion void of pain to improve ease of daily activity.              3. Pt will report ability to return to running void of pain to improve QOL. PLAN 
[]  Upgrade activities as tolerated     [x]  Continue plan of care 
[]  Update interventions per flow sheet      
[]  Discharge due to:_ 
[]  Other:_   
 
 Preeti Agosto, DPT, CMTPT 2/19/2019  6:02 PM 
 
Future Appointments Date Time Provider Eric Farah 2/21/2019  5:30 PM [facility]  
3/5/2019  4:30 PM Emmie Brown PT [facility]  
3/7/2019  4:30 PM Patti Bates [facility]  
3/11/2019  3:30 PM Margarita Gamez  [address]

## 2019-02-21 ENCOUNTER — HOSPITAL ENCOUNTER (OUTPATIENT)
Dept: PHYSICAL THERAPY | Age: 34
Discharge: HOME | End: 2019-02-21
Payer: OTHER GOVERNMENT

## 2019-02-21 PROCEDURE — 97110 THERAPEUTIC EXERCISES: CPT

## 2019-02-21 PROCEDURE — 97140 MANUAL THERAPY 1/> REGIONS: CPT

## 2019-02-21 NOTE — PROGRESS NOTES
PT DAILY TREATMENT NOTE 12 Patient Name: Allyson Lu Date:2019 : 1985 [x]  Patient  Verified Payor: MARCOS / Plan: Zarina Pettit / Product Type: Fabio Hope / In time:4:06  Out time:4:51 Total Treatment Time (min): 45 Visit #: 4 of 8 Treatment Area: Low back pain [M54.5] SUBJECTIVE Pain Level (0-10 scale): 2/10 Any medication changes, allergies to medications, adverse drug reactions, diagnosis change, or new procedure performed?: [x] No    [] Yes (see summary sheet for update) Subjective functional status/changes:   [] No changes reported The patient states that his back was sore after the last dry needling session, but the soreness didn't last too long. He also indicated that he performed back extension exercises in the gym and noticed it didn't feel good. OBJECTIVE 37 min Therapeutic Exercise:  [x] See flow sheet :  
Rationale: increase ROM and increase strength to improve the patients ability to improve ADL ease. 8 min Manual Therapy:  Graston Technique Treatment Intervention: 
 
Patient positioning: prone and prayer stretch with left sidebending Treatment location: right QL/paraspinals Graston Technique Instruments utilized: GT1 Explained effects and benefits of Graston Technique, including potential for post treatment soreness and bruising. Patient was provided the opportunity to ask any questions and verbalized understanding. Patient wished to proceed with treatment. Rationale: decrease pain, increase ROM and increase tissue extensibility to improve ADL ease. With 
 [] TE 
 [] TA 
 [] neuro 
 [] other: Patient Education: [x] Review HEP [] Progressed/Changed HEP based on:  
[] positioning   [] body mechanics   [] transfers   [] heat/ice application   
[] other:   
 
Other Objective/Functional Measures:  
Good pain control attained post session. Good lumbar and thoracic ROM appreciated post exercise. Pain Level (0-10 scale) post treatment: 0/10 ASSESSMENT/Changes in Function: The patient is going on a cruise for a week and will return following. Educated the patient to not perform gym exercise that include significant lumbar extension, particularly in a lordotic position, as he we corrected for this in the clinic during bridges and abolished his pain. The patient verbalized understanding and left in no apparent distress. Patient will continue to benefit from skilled PT services to modify and progress therapeutic interventions, address functional mobility deficits, address ROM deficits, address strength deficits, analyze and address soft tissue restrictions, analyze and cue movement patterns, analyze and modify body mechanics/ergonomics, assess and modify postural abnormalities and instruct in home and community integration to attain remaining goals. []  See Plan of Care 
[]  See progress note/recertification 
[]  See Discharge Summary Progress towards goals / Updated goals: 
Short Term Goals: To be accomplished in 2 weeks: 
             1. Patient will report performance of HEP at least 2 times per day to facilitate improved outcomes and improved self management. - reports compliance 2-13-19 
  
Long Term Goals: To be accomplished in 4 weeks: 
             1. Patient will report FOTO score of 81 or better to indicate significant improvement in functional status.              2. Pt will demonstrate full trunk flexion void of pain to improve ease of daily activity.              3. Pt will report ability to return to running void of pain to improve QOL. PLAN 
[]  Upgrade activities as tolerated     []  Continue plan of care 
[]  Update interventions per flow sheet      
[]  Discharge due to:_ 
[]  Other:_   
 
Zenobia Chavez, PT 2/21/2019  5:06 PM 
 
Future Appointments Date Time Provider Eric Farah 3/5/2019  4:30 PM Compton, Marylene Hope, PT [id number]  
 3/7/2019  4:30 PM Gerard, [address] [facility]  
3/11/2019  3:30 PM Erik Caballero  [address]

## 2019-03-05 ENCOUNTER — HOSPITAL ENCOUNTER (OUTPATIENT)
Dept: PHYSICAL THERAPY | Age: 34
Discharge: HOME | End: 2019-03-05
Payer: OTHER GOVERNMENT

## 2019-03-05 PROCEDURE — 97110 THERAPEUTIC EXERCISES: CPT | Performed by: PHYSICAL THERAPIST

## 2019-03-05 PROCEDURE — 97112 NEUROMUSCULAR REEDUCATION: CPT | Performed by: PHYSICAL THERAPIST

## 2019-03-05 NOTE — PROGRESS NOTES
PT DAILY TREATMENT NOTE 10-18    Patient Name: Ricky Villa  Date:3/5/2019  : 1985  [x]  Patient  Verified  Payor:  / Plan: [employer] Neither / Product Type:  /    In time:431  Out time:512  Total Treatment Time (min): 41  Visit #: 5 of 8    Treatment Area: Low back pain [M54.5]    SUBJECTIVE  Pain Level (0-10 scale): 2  Any medication changes, allergies to medications, adverse drug reactions, diagnosis change, or new procedure performed?: [x] No    [] Yes (see summary sheet for update)  Subjective functional status/changes:   [] No changes reported  \"I feel good. I have a little discomfort when I bend forward. \"    OBJECTIVE    30 min Therapeutic Exercise:  [] See flow sheet :   Rationale: increase ROM, increase strength, improve coordination, improve balance and increase proprioception to improve the patients ability to tolerate daily activity with reduced pain and improved mobility. 11 min Neuromuscular Re-education:  []  See flow sheet :   Rationale: increase ROM, increase strength, improve coordination, improve balance and increase proprioception to improve the patients ability to tolerate daily activity with reduced pain and improved mobility. With   [] TE   [] TA   [] neuro   [] other: Patient Education: [x] Review HEP    [] Progressed/Changed HEP based on:   [] positioning   [] body mechanics   [] transfers   [] heat/ice application    [] other:      Other Objective/Functional Measures: PT provides OP to open books for improved pt mobility. Pain Level (0-10 scale) post treatment: 2    ASSESSMENT/Changes in Function: Pt notes low level discomfort with forward flexion, but otherwise is progressing nicely. He notes reduced muscle tension. Pt to have dry needling next session. Will monitor and progress.     Patient will continue to benefit from skilled PT services to modify and progress therapeutic interventions, address functional mobility deficits, address ROM deficits, address strength deficits, analyze and address soft tissue restrictions, analyze and cue movement patterns, analyze and modify body mechanics/ergonomics, assess and modify postural abnormalities and address imbalance/dizziness to attain remaining goals. Progress towards goals / Updated goals:  Short Term Goals: To be accomplished in 2 weeks:               1. Patient will report performance of HEP at least 2 times per day to facilitate improved outcomes and improved self management. - reports compliance 2-13-19     Long Term Goals: To be accomplished in 4 weeks:               1. Patient will report FOTO score of 81 or better to indicate significant improvement in functional status.              2. Pt will demonstrate full trunk flexion void of pain to improve ease of daily activity.              3. Pt will report ability to return to running void of pain to improve QOL.         PLAN  []  Upgrade activities as tolerated     [x]  Continue plan of care  []  Update interventions per flow sheet       []  Discharge due to:_  []  Other:_      Gregory Kumar PT 3/5/2019  4:47 PM    Future Appointments   Date Time Provider [facility]   3/7/2019  4:30 PM [address] [place] [address]   3/11/2019  3:30 PM gY Frey  [address]

## 2019-03-07 ENCOUNTER — HOSPITAL ENCOUNTER (OUTPATIENT)
Dept: PHYSICAL THERAPY | Age: 34
Discharge: HOME | End: 2019-03-07
Payer: OTHER GOVERNMENT

## 2019-03-07 PROCEDURE — 97110 THERAPEUTIC EXERCISES: CPT

## 2019-03-07 PROCEDURE — 97112 NEUROMUSCULAR REEDUCATION: CPT

## 2019-03-07 PROCEDURE — 97140 MANUAL THERAPY 1/> REGIONS: CPT

## 2019-03-07 NOTE — PROGRESS NOTES
[address]  [address]  [place], [address]  [phone] fax    Physical Therapy Progress Note  Patient name: Carmen Buck Start of Care: 2019   Referral source: Shelia Gibson MD : 1985                Medical Diagnosis: Low back pain [M54.5]  Payor:  / Plan: Ramya Doe / Product Type: Donaldkarina Burkett /  Onset Date: 2018   Treatment Diagnosis: mechanical LBP   Prior Hospitalization: see medical history Provider#: [id number]   Medications: Verified on Patient summary List    Comorbidities: unremarkable   Prior Level of Function:  exercising, lifting, running regularly without limitations  Limitations to PLOF: continues to participate in exercise, but some limitations with harder workouts and running/jumping 2/2 back pain  Visits from Start of Care: 6    Missed Visits: 0      Established Goals:        Excellent         Good         Limited            None  [x] Increased ROM   []  [x]  []  []  [] Increased Strength  []  []  []  []  [] Increased Mobility  []  []  []  []   [x] Decreased Pain   []  []  [x]  []  [] Decreased Swelling  []  []  []  []    Key Functional Changes:   Short Term Goals: To be accomplished in 2 weeks:               2. Patient will report performance of HEP at least 2 times per day to facilitate improved outcomes and improved self management. - reports compliance 19     Long Term Goals: To be accomplished in 4 weeks:               1. Patient will report FOTO score of 81 or better to indicate significant improvement in functional status.              2. Pt will demonstrate full trunk flexion void of pain to improve ease of daily activity. Full trunk flexion, pt reports some tension 3/7/19               3. Pt will report ability to return to running void of pain to improve QOL.  Met- pt reports returning to brief running void of pain (1-2 miles) 3/7/19    Updated Goals: to be achieved in 3 weeks:   1. Patient will report FOTO score of 81 or better to indicate significant improvement in functional status. 2. Pt will demonstrate full trunk flexion void of pain to improve ease of daily activity. 3. Pt will perform QP alternating extension without loss of neutral pelvis to improve core stability with recreational activities. ASSESSMENT/RECOMMENDATIONS: Pt reporting mostly tension through lumbar spine with forward flexion, not really limiting to functional task performance. He would benefit from brief continuance of PT to progress into new positions and functional task performance. [x]Continue therapy per initial plan/protocol at a frequency of  2 x per week for 3 weeks  []Continue therapy with the following recommended changes:_____________________      _____________________________________________________________________  []Discontinue therapy progressing towards or have reached established goals  []Discontinue therapy due to lack of appreciable progress towards goals  []Discontinue therapy due to lack of attendance or compliance  []Await Physician's recommendations/decisions regarding therapy  []Other:________________________________________________________________    Thank you for this referral.    Gypsy Delacruz DPT, CMTPT 3/7/2019 6:23 PM  NOTE TO PHYSICIAN:  PLEASE COMPLETE THE ORDERS BELOW AND   FAX TO [facility]: [phone]  If you are unable to process this request in 24 hours please contact our office: [phone] I have read the above report and request that my patient continue as recommended. ? I have read the above report and request that my patient continue therapy with the following changes/special instructions:__________________________________________________________  ? I have read the above report and request that my patient be discharged from therapy.     Physicians signature: ______________________________Date: ______Time:______

## 2019-03-07 NOTE — PROGRESS NOTES
PT DAILY TREATMENT NOTE 12    Patient Name: Brian Babb  Date:3/7/2019  : 1985  [x]  Patient  Verified  Payor:  / Plan: Nguyễn Valerio [address] / Product Type:  /    In time:4:30  Out time:5:13  Total Treatment Time (min): 43  Visit #: 6 of 8    Treatment Area: Low back pain [M54.5]    SUBJECTIVE  Pain Level (0-10 scale):  2  Any medication changes, allergies to medications, adverse drug reactions, diagnosis change, or new procedure performed?: [x] No    [] Yes (see summary sheet for update)  Subjective functional status/changes:   [] No changes reported  Pt reports that he mainly feels some tightness in his back when having to bend forward    OBJECTIVE    Modality rationale: Pd to improve the patients ability to    Min Type Additional Details    [] Estim:  []Unatt       []IFC  []Premod                        []Other:  []w/ice   []w/heat  Position:  Location:    [] Estim: []Att    []TENS instruct  []NMES                    []Other:  []w/US   []w/ice   []w/heat  Position:  Location:    []  Traction: [] Cervical       []Lumbar                       [] Prone          []Supine                       []Intermittent   []Continuous Lbs:  [] before manual  [] after manual    []  Ultrasound: []Continuous   [] Pulsed                           []1MHz   []3MHz W/cm2:  Location:    []  Iontophoresis with dexamethasone         Location: [] Take home patch   [] In clinic    []  Ice     []  heat  []  Ice massage  []  Laser   []  Anodyne Position:  Location:    []  Laser with stim  []  Other:  Position:  Location:    []  Vasopneumatic Device Pressure:       [] lo [] med [] hi   Temperature: [] lo [] med [] hi   [] Skin assessment post-treatment:  []intact []redness- no adverse reaction    []redness  adverse reaction:       27 min Therapeutic Exercise:  [] See flow sheet :   Rationale: increase ROM and increase strength to improve the patients ability to perform daily tasks    8 min Neuromuscular Re-education:  []  See flow sheet :   Rationale: improve coordination  to improve the patients ability to perform functional tasks with improved core activation and stability    8 min Manual Therapy:  Palpation and setup for DN   Rationale: decrease pain, increase tissue extensibility and decrease trigger points to improve ease of ADL performance    Dry Needling Procedure Note    Procedure: An intramuscular manual therapy (dry needling) and a neuro-muscular re-education treatment was done to deactivate myofascial trigger points with a 30 gauge filament needle under aseptic technique. Indications:  [] Myofascial pain and dysfunction [] Muscled spasms  [] Myalgia/myositis   [] Muscle cramps  [] Muscle imbalances  [] Other:    Chart reviewed for the following:  Harriet LARA Later, have reviewed the medical history, summary list and precautions/contraindications for [employer].   TIME OUT performed immediately prior to start of procedure:  Harriet LARA Later, have performed the following reviews on [employer] prior to the start of the session:      [x] Verified patient identification by name and date of birth    [x] Agreement on all muscles being treated was verified   [x] Purpose of dry needling, side effects, possible complications, risks and benefits were explained to the patient   [x] Procedure site(s) verified  [x] Patient was positioned for comfort and draped for privacy  [x] Informed Consent was signed (initial visit) and verified verbally (subsequent visits)  [x] Patient was instructed on the need to report the use of blood thinners and/or immunosuppressant medications  [x] How to respond to possible adverse effects of treatment  [x] Self treatment of post needling soreness: ice, heat (moist heat, heat wraps) and stretching  [x] Opportunity was given to ask any questions, all questions were answered            Time: 4:41  Date of procedure: 3/7/2019    Treatment: The following muscles were treated today with intramuscular dry needling  [] Left [] Right Abdominals: Ant Rectus Abdominis  [] Left [] Right External Oblique / Internal Oblique / Transverse Abdominis  [] Left [] Right Thoracic Multifidi / Rotatores  [] Left [x] Right Iliocostalis Thoracis / Lumborum  [] Left [x] Right Longissimus Thoracis / Lumborum  [] Left [] Right Lumbar Multifidi  [] Left [] Right Serratus Posterior Inferior  [] Left [] Right Quadratus Lumborum  [] Left [] Right Psoas  [] Left [] Right Iliacus  [] Left [] Right Iliopsoas (inguinal)  [] Left [] Right Piriformis  [] Left [] Right Quadratus Femoris  [] Left [] Right Jackie Still / Josephine Tillman / Marbin Lynn  [] Left [] Right Obturator Internus  [] Left [] Right Obturator Externus / Donata Conine / Inferior Gemellus    [] Left [] Right Tensor Fasciae Lilli  [] Left [] Right Iliotibial Band  [] Left [] Right Pectineus  [] Left [] Right Sartorius  [] Left [] Right Gracilis  [] Left [] Right Adductor Brevis / Adductor Longus / Adductor Elpidio  [] Left [] Right Rectus Femoris / Vastus Lateralis / Vastus Intermedius / Vastus Medialis Obliquus  [] Left [] Right Tibialis Anterior / Posterior  [] Left [] Right Extensor Digitorum Longus / Brevis  [] Left [] Right Extensor Hallucis Longus / Brevis  [] Left [] Right Hamstrings: Biceps Femoris / Kristal Ganser / Semimembranosis  [] Left [] Right Popliteus / Planteris  [] Left [] Right Gastrocnemius Medial / Lateral  [] Left [] Right Soleus  [] Left [] Right Peronei: Longus / Amaryllis Body / Tertius  [] Left [] Right Flexor Digitorum Longus / Brevis  [] Left [] Right Flexor Hallucis Longus / Brevis  [] Left [] Right Quadratus Plantae  [] Left [] Right Abductor Digiti Minimi  [] Left [] Right Foot Interossei  [] Left [] Right Other:    Patient's response to today's treatment:  [x] Latent Twitch Response  [] Muscle relaxation [] Pain Relief  [x] Post needling soreness [x] without complications  [] Increased Range of Motion  [] Other:     Performed by:  Berkley Alves David Hopkins, DPT, CMTPT          With   [] TE   [] TA   [] neuro   [] other: Patient Education: [x] Review HEP    [] Progressed/Changed HEP based on:   [] positioning   [] body mechanics   [] transfers   [] heat/ice application    [] other:      Other Objective/Functional Measures: pt with limited tolerance to DN, likely hold for foreseeable future     Pain Level (0-10 scale) post treatment: 2    ASSESSMENT/Changes in Function: Pt reporting mostly tension through lumbar spine with forward flexion, not really limiting to functional task performance. He would benefit from brief continuance of PT to progress into new positions and functional task performance. Patient will continue to benefit from skilled PT services to modify and progress therapeutic interventions, address functional mobility deficits, address ROM deficits, address strength deficits, analyze and address soft tissue restrictions, analyze and cue movement patterns and analyze and modify body mechanics/ergonomics to attain remaining goals. []  See Plan of Care  [x]  See progress note/recertification  []  See Discharge Summary         Progress towards goals / Updated goals:  Short Term Goals: To be accomplished in 2 weeks:               1. Patient will report performance of HEP at least 2 times per day to facilitate improved outcomes and improved self management. - reports compliance 2-13-19     Long Term Goals: To be accomplished in 4 weeks:               1. Patient will report FOTO score of 81 or better to indicate significant improvement in functional status.              2. Pt will demonstrate full trunk flexion void of pain to improve ease of daily activity. Full trunk flexion, pt reports some tension 3/7/19               3. Pt will report ability to return to running void of pain to improve QOL.  Met- pt reports returning to brief running void of pain (1-2 miles) 3/7/19    PLAN  [x]  Upgrade activities as tolerated     []  Continue plan of care  []  Update interventions per flow sheet       []  Discharge due to:_  []  Other:_      Livia Ramos DPT, CMTPT 3/7/2019  4:44 PM    Future Appointments   Date Time Provider Eric Farah   3/11/2019  3:30 PM Gail Crum  [address]

## 2019-03-11 ENCOUNTER — OFFICE VISIT (OUTPATIENT)
Dept: ORTHOPEDIC SURGERY | Age: 34
End: 2019-03-11

## 2019-03-11 VITALS
WEIGHT: 207 LBS | TEMPERATURE: 98.2 F | SYSTOLIC BLOOD PRESSURE: 131 MMHG | HEART RATE: 71 BPM | BODY MASS INDEX: 32.49 KG/M2 | HEIGHT: 67 IN | DIASTOLIC BLOOD PRESSURE: 90 MMHG | OXYGEN SATURATION: 98 %

## 2019-03-11 DIAGNOSIS — M54.59 MECHANICAL LOW BACK PAIN: Primary | ICD-10-CM

## 2019-03-11 DIAGNOSIS — M79.18 MYOFASCIAL PAIN: ICD-10-CM

## 2019-03-11 NOTE — PROGRESS NOTES
[address]  [address], [address]  [place], [address]  Phone: [phone]  Fax: [phone]        Jamey Diop  : 1985  PCP: Emilee Last MD  3/11/2019    PROGRESS NOTE      HISTORY OF PRESENT ILLNESS  Heaven Wade is a 35 y.o. male. Esperanza Lucio comes in to the office today c/o low back pain following a fall at work when he slipped on some ice landing on his back in 2018. He works for the [employer]. He denies any radicular symptoms. He has been in PT for the last 5 weeks ([facility]), and he is unsure as to whether it has been beneficial. His pain is worse after prolonged standing or sitting, but is alleviated with walking or moving. He is still able to run without pain. He continues to do the [employer] workout series, but it exacerbates his pain some. He has been using ice as a modality. He presents today for PT f/u. He had a slight delay in scheduling PT due to insurance, but he is finding some relief from the PT and dry needling. He notes that his pain level remains at about the same level, but he is no longer experiencing the muscle spasms in his back. 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 He is interested in discussing the option of chiropractic care. He rates his pain as a 2/10 today. ASSESSMENT  His pain remains myofascial in nature. PLAN  1. Continue with PT and maintaining an HEP. He can call if he needs a PT extension. Pt will f/u in 8 weeks or sooner as needed. Diagnoses and all orders for this visit:    1. Mechanical low back pain    2. Myofascial pain       Follow-up Disposition:  Return in about 8 weeks (around 2019). PAST MEDICAL HISTORY   Past Medical History:   Diagnosis Date    Lactose intolerance        No past surgical history on file. Cash Gaytan       MEDICATIONS         ALLERGIES  Allergies   Allergen Reactions    Lactose Other (comments)     intolerance          SOCIAL HISTORY    Social History     Socioeconomic History    Marital status:      Spouse name: Not on file    Number of children: Not on file    Years of education: Not on file    Highest education level: Not on file   Tobacco Use    Smoking status: Never Smoker    Smokeless tobacco: Never Used   Substance and Sexual Activity    Alcohol use: Yes     Frequency: Never     Comment: OCCASIONALLY   Other Topics Concern       FAMILY HISTORY  No family history on file. REVIEW OF SYSTEMS  Review of Systems   Musculoskeletal: Positive for back pain. PHYSICAL EXAMINATION  Visit Vitals  /90 (BP 1 Location: Right arm, BP Patient Position: Sitting)   Pulse 71   Temp 98.2 °F (36.8 °C) (Oral)   Ht 5' 7\" (1.702 m)   Wt 207 lb (93.9 kg)   SpO2 98%   BMI 32.42 kg/m²       Pain Assessment  3/11/2019   Location of Pain Back   Severity of Pain 2   Quality of Pain -   Duration of Pain Persistent   Frequency of Pain Constant   Aggravating Factors -   Limiting Behavior No   Relieving Factors Rest   Result of Injury No   Work-Related Injury No   Type of Injury Slip       Constitutional:  Well developed, well nourished, in no acute distress. Psychiatric: Affect and mood are appropriate. Integumentary: No rashes or abrasions noted on exposed areas. SPINE/MUSCULOSKELETAL EXAM    Cervical spine:  Neck is midline. Normal muscle tone. No focal atrophy is noted. ROM pain free. Shoulder ROM intact. No tenderness to palpation. Negative Spurling's sign. Negative Tinel's sign. Negative Farah's sign. Sensation in the bilateral arms grossly intact to light touch.      Lumbar spine:  No rash, ecchymosis, or gross obliquity. No fasciculations. No focal atrophy is noted. No pain with hip ROM. Full range of motion. Tenderness to palpation of lumbar paraspinals.   No tenderness to palpation at the sciatic notch. SI joints non-tender. Trochanters non tender. Sensation in the bilateral legs grossly intact to light touch.     No directional preference. MOTOR:      Biceps  Triceps Deltoids Wrist Ext Wrist Flex Hand Intrin   Right 5/5 5/5 5/5 5/5 5/5 5/5   Left 5/5 5/5 5/5 5/5 5/5 5/5             Hip Flex  Quads Hamstrings Ankle DF EHL Ankle PF   Right 5/5 5/5 5/5 5/5 5/5 5/5   Left 5/5 5/5 5/5 5/5 5/5 5/5     DTRs are 2+ biceps, triceps, brachioradialis, patella, and Achilles.     Negative Straight Leg raise. Squat not tested. No difficulty with tandem gait.      Ambulation without assistive device. FWB.       RADIOGRAPHS  Lumbar MRI images taken on 12/5/18 personally reviewed with patient:  FINDINGS:     General: Ambiguous vertebral segmentation. For interpretation purposes, L5 will  be considered partially sacralized, left more than right with pseudoarticulation  of the left L5-S1 lateral masses. Vertebral body heights are preserved. Above  L5, disc space heights are preserved. No focal listhesis. Lumbar lordosis  maintained. Conus ends at level L1. No abnormal signal in the distal cord. No  abnormal epidural collection identified. No suspicious marrow signal.     Miscellaneous: No incidental acute or suspicious findings outside of the lumbar  spine.     Levels:     T11-T12: Evaluated sagittal plane only. No significant degenerative change or  stenosis.     T12-L1: No significant degenerative change or stenosis.     L1-L2: No significant degenerative change or stenosis.     L2-L3: No significant degenerative change or stenosis.     L3-L4: No significant degenerative change or stenosis.     L4-L5: Minimal disc bulge. Mild facet arthropathy. Spinal canal patent. Minimal  foraminal narrowing.     L5-S1: Transitional level. No significant disc disease. Spinal canal patent.   Mild left foraminal stenosis related to bony prominence of the left L5-S1  lateral mass pseudoarticulation. Right foramen patent.     Imaged sacrum: Unremarkable.     IMPRESSION  IMPRESSION:     1. No acute findings. No critical stenosis. Overall mild degenerative changes  detailed level by level above.     2. Ambiguous vertebral segmentation. L5 is considered partially lumbarized for  interpretation purposes. -At the L5-S1 transitional level there is mild left foraminal stenosis related  to bony prominence of the left L5-S1 lateral mass pseudoarticulation complex. 12 minutes of face-to-face contact were spent with the patient during today's visit extensively discussing symptoms and treatment plan. All questions were answered. More than half of this visit today was spent on counseling.      Written by Brett Bella as dictated by Lilia Salmon MD

## 2019-03-12 ENCOUNTER — HOSPITAL ENCOUNTER (OUTPATIENT)
Dept: PHYSICAL THERAPY | Age: 34
Discharge: HOME | End: 2019-03-12
Payer: OTHER GOVERNMENT

## 2019-03-12 PROCEDURE — 97110 THERAPEUTIC EXERCISES: CPT

## 2019-03-12 PROCEDURE — 97112 NEUROMUSCULAR REEDUCATION: CPT

## 2019-03-12 NOTE — PROGRESS NOTES
PT DAILY TREATMENT NOTE     Patient Name: Johanna Hale  Date:3/12/2019  : 1985  [x]  Patient  Verified  Payor: MARCOS / Plan: [address] / Product Type:  /    In time:4:33  Out time:5:18  Total Treatment Time (min): 45  Visit #: 1 of 6    Treatment Area: Low back pain [M54.5]    SUBJECTIVE  Pain Level (0-10 scale): 2/10  Any medication changes, allergies to medications, adverse drug reactions, diagnosis change, or new procedure performed?: [x] No    [] Yes (see summary sheet for update)  Subjective functional status/changes:   [] No changes reported  The patient reports he didn't do as much today in anticipation for increased exercise in the clinic. OBJECTIVE  35 min Therapeutic Exercise:  [x] See flow sheet :   Rationale: increase ROM and increase strength to improve the patients ability to improve ADL ease. 10 min Neuromuscular Re-education:  [x]  See flow sheet :   Rationale: increase ROM and increase strength  to improve the patients ability to improve ADL ease. With   [] TE   [] TA   [] neuro   [] other: Patient Education: [x] Review HEP    [] Progressed/Changed HEP based on:   [] positioning   [] body mechanics   [] transfers   [] heat/ice application    [] other:      Other Objective/Functional Measures: FOTO: 94    Pain Level (0-10 scale) post treatment: 0/10    ASSESSMENT/Changes in Function: Progressed interventions per flow sheet with significant fatigue noted upon anterior core activation, though progression tolerated well.     Patient will continue to benefit from skilled PT services to modify and progress therapeutic interventions, address functional mobility deficits, address ROM deficits, address strength deficits, analyze and address soft tissue restrictions, analyze and cue movement patterns, analyze and modify body mechanics/ergonomics, assess and modify postural abnormalities and instruct in home and community integration to attain remaining goals. []  See Plan of Care  []  See progress note/recertification  []  See Discharge Summary         Progress towards goals / Updated goals:  Updated Goals: to be achieved in 3 weeks:   1. Patient will report FOTO score of 81 or better to indicate significant improvement in functional status. Met 94 3/12/2019  2. Pt will demonstrate full trunk flexion void of pain to improve ease of daily activity.   3. Pt will perform QP alternating extension without loss of neutral pelvis to improve core stability with recreational activities.     PLAN  []  Upgrade activities as tolerated     [x]  Continue plan of care  []  Update interventions per flow sheet       []  Discharge due to:_  []  Other:_      Gregorio Gamble, PT 3/12/2019  5:54 PM    Future Appointments   Date Time Provider Eric Farah   3/26/2019  5:00 PM Sam Ser [facility]   3/28/2019  5:00 PM Laurita Chavez, PT [facility]   4/2/2019  4:30 PM Neil Fleischer [facility]   4/4/2019  4:30 PM Laurita Chavez, PT [facility]   4/9/2019  4:30 PM Laurita Chavez, PT [facility]   4/11/2019  4:30 PM Neil Fleischer [facility]   5/6/2019  3:30 PM Yg Frey  [address]

## 2019-03-26 ENCOUNTER — HOSPITAL ENCOUNTER (OUTPATIENT)
Dept: PHYSICAL THERAPY | Age: 34
Discharge: HOME | End: 2019-03-26
Payer: OTHER GOVERNMENT

## 2019-03-26 PROCEDURE — 97112 NEUROMUSCULAR REEDUCATION: CPT

## 2019-03-26 PROCEDURE — 97110 THERAPEUTIC EXERCISES: CPT

## 2019-03-26 NOTE — PROGRESS NOTES
PT DAILY TREATMENT NOTE  Patient Name: Kely Solitario Date:3/26/2019 : 1985 [x]  Patient  Verified Payor: MARCOS / Plan: Alexandrea Gutiérrez / Product Type: [place] / In time:5:00  Out time:5:39 Total Treatment Time (min): 39 Visit #: 2 of 6 Treatment Area: Low back pain [M54.5] SUBJECTIVE Pain Level (0-10 scale): 0 Any medication changes, allergies to medications, adverse drug reactions, diagnosis change, or new procedure performed?: [x] No    [] Yes (see summary sheet for update) Subjective functional status/changes:   [] No changes reported Pt reports no pain through his back. Reports occasional tightness when forward flexing OBJECTIVE 13 min Therapeutic Exercise:  [] See flow sheet :  
Rationale: increase ROM and increase strength to improve the patients ability to perform ADLs 24 min Neuromuscular Re-education:  []  See flow sheet :  
Rationale: improve coordination and increase proprioception  to improve the patients ability to perform recreational activity with improved anterior chain stability With 
 [] TE 
 [] TA 
 [] neuro 
 [] other: Patient Education: [x] Review HEP [] Progressed/Changed HEP based on:  
[] positioning   [] body mechanics   [] transfers   [] heat/ice application   
[] other:   
 
Other Objective/Functional Measures:   
 
Pain Level (0-10 scale) post treatment: 0 
 
ASSESSMENT/Changes in Function: Pt with improved tolerance to anterior chain activities today, able to progress repetitions. Pain levels well controlled at this time and tightness has reduced as well. Continue to progress anterior stability as tolerated towards HEP.  
 
Patient will continue to benefit from skilled PT services to modify and progress therapeutic interventions, address functional mobility deficits, address ROM deficits, address strength deficits, analyze and address soft tissue restrictions, analyze and cue movement patterns and analyze and modify body mechanics/ergonomics to attain remaining goals. []  See Plan of Care 
[]  See progress note/recertification 
[]  See Discharge Summary Updated Goals: to be achieved in 3 weeks: 
 1. Patient will report FOTO score of 81 or better to indicate significant improvement in functional status. Met 94 3/12/2019 2. Pt will demonstrate full trunk flexion void of pain to improve ease of daily activity. Met- no pain today fingertips to floor 3/26/19 3. Pt will perform QP alternating extension without loss of neutral pelvis to improve core stability with recreational activities. PLAN [x]  Upgrade activities as tolerated     []  Continue plan of care 
[]  Update interventions per flow sheet      
[]  Discharge due to:_ 
[]  Other:_ Romel Dial DPT, CMTPT 3/26/2019  5:22 PM 
 
Future Appointments Date Time Provider Eric Santosi 3/28/2019  5:00 PM Lul SWANSON, PT [facility]  
4/2/2019  4:30 PM Queen Beth [facility]  
4/4/2019  4:30 PM Adin Ritter PT [facility]  
4/9/2019  4:30 PM Adin Ritter PT [facility]  
4/11/2019  4:30 PM Queen Beth [facility]  
5/6/2019  3:30 PM Akua Wren  [address]

## 2019-03-28 ENCOUNTER — HOSPITAL ENCOUNTER (OUTPATIENT)
Dept: PHYSICAL THERAPY | Age: 34
Discharge: HOME | End: 2019-03-28
Payer: OTHER GOVERNMENT

## 2019-03-28 PROCEDURE — 97112 NEUROMUSCULAR REEDUCATION: CPT

## 2019-03-28 PROCEDURE — 97110 THERAPEUTIC EXERCISES: CPT

## 2019-03-29 NOTE — PROGRESS NOTES
PT DAILY TREATMENT NOTE  Patient Name: Monica Vieyra Date:3/29/2019 : 1985 [x]  Patient  Verified Payor: MARCOS / Plan: Barbara Cisneros / Product Type: Rafal [place] / In time:5:00  Out time:5:35 Total Treatment Time (min): 35 Visit #: 3 of 6 Treatment Area: Low back pain [M54.5] SUBJECTIVE Pain Level (0-10 scale): 0/10 Any medication changes, allergies to medications, adverse drug reactions, diagnosis change, or new procedure performed?: [x] No    [] Yes (see summary sheet for update) Subjective functional status/changes:   [] No changes reported The patient reports being able to stand for longer periods of time today and felt that it felt good enough that he noticed this at work today. He indicates that he feels the core exercises have been helping. OBJECTIVE 10 min Therapeutic Exercise:  [] See flow sheet :  
Rationale: increase ROM and increase strength to improve the patients ability to improve ADL ease. 25 min Neuromuscular Re-education:  []  See flow sheet :  
Rationale: increase ROM and increase strength  to improve the patients ability to improve ADL ease. With 
 [] TE 
 [] TA 
 [] neuro 
 [] other: Patient Education: [x] Review HEP [] Progressed/Changed HEP based on:  
[] positioning   [] body mechanics   [] transfers   [] heat/ice application   
[] other:   
 
Other Objective/Functional Measures:  
Notable improvement regarding attainment of neutral pelvis during QP exercises. Probable corrective anterior pelvic tilt secondary to reformer feedback. Pain Level (0-10 scale) post treatment: 0/10 ASSESSMENT/Changes in Function: The patient able to perform progressed core exercises during session with fatigue. No increase in pain post session despite progressions. He left the clinic in no apparent distress.  
 
Patient will continue to benefit from skilled PT services to modify and progress therapeutic interventions, address functional mobility deficits, address ROM deficits, address strength deficits, analyze and address soft tissue restrictions, analyze and cue movement patterns, analyze and modify body mechanics/ergonomics, assess and modify postural abnormalities and instruct in home and community integration to attain remaining goals. []  See Plan of Care 
[]  See progress note/recertification 
[]  See Discharge Summary Progress towards goals / Updated goals: 
 1. Patient will report FOTO score of 81 or better to indicate significant improvement in functional status. Met 94 3/12/2019 2. Pt will demonstrate full trunk flexion void of pain to improve ease of daily activity. Met- no pain today fingertips to floor 3/26/19 3. Pt will perform QP alternating extension without loss of neutral pelvis to improve core stability with recreational activities. Improving with notable improvement with proprioceptive input of reformer 3/29/2019 PLAN 
[]  Upgrade activities as tolerated     [x]  Continue plan of care 
[]  Update interventions per flow sheet      
[]  Discharge due to:_ 
[]  Other:_   
 
Hiwot Gee, PT 3/29/2019  7:02 AM 
 
Future Appointments Date Time Provider Eric Santosi 4/2/2019  4:30 PM Gerard, [address] [facility]  
4/4/2019  4:30 PM [facility]  
4/9/2019  4:30 PM [facility]  
4[facility]11/2019  [facility]  
5/6/2019  3:30 PM Aguilar Wilson  [address]

## 2019-04-02 ENCOUNTER — HOSPITAL ENCOUNTER (OUTPATIENT)
Dept: PHYSICAL THERAPY | Age: 34
Discharge: HOME | End: 2019-04-02
Payer: OTHER GOVERNMENT

## 2019-04-02 PROCEDURE — 97110 THERAPEUTIC EXERCISES: CPT

## 2019-04-02 PROCEDURE — 97112 NEUROMUSCULAR REEDUCATION: CPT

## 2019-04-02 NOTE — PROGRESS NOTES
PT DAILY TREATMENT NOTE 12    Patient Name: Hima Sep  Date:2019  : 1985  [x]  Patient  Verified  Payor:  / Plan: [address] / Product Type:  /    In time:4:46  Out time:5:13  Total Treatment Time (min): 27  Visit #: 4 of 6    Treatment Area: Low back pain [M54.5]    SUBJECTIVE  Pain Level (0-10 scale): 0  Any medication changes, allergies to medications, adverse drug reactions, diagnosis change, or new procedure performed?: [x] No    [] Yes (see summary sheet for update)  Subjective functional status/changes:   [] No changes reported  Pt reports his back was a little tight yesterday \"I think I messed up my form on my squats yesterday\"    OBJECTIVE    12 min Therapeutic Exercise:  [] See flow sheet :   Rationale: increase ROM and increase strength to improve the patients ability to perform daily tasks and ADLs    15 min Neuromuscular Re-education:  []  See flow sheet :   Rationale: increase strength, improve coordination and increase proprioception  to improve the patients ability to perform recreational activity with improved core stability         With   [] TE   [] TA   [] neuro   [] other: Patient Education: [x] Review HEP    [] Progressed/Changed HEP based on:   [] positioning   [] body mechanics   [] transfers   [] heat/ice application    [] other:      Other Objective/Functional Measures:      Pain Level (0-10 scale) post treatment: 0    ASSESSMENT/Changes in Function: Pt demonstrating improved tolerance to Reformer activities, with less fatigue and shaking noted. Progress with further core strengthening towards D/C to HEP in upcoming visits.     Patient will continue to benefit from skilled PT services to modify and progress therapeutic interventions, address functional mobility deficits, address ROM deficits, address strength deficits, analyze and address soft tissue restrictions, analyze and cue movement patterns and analyze and modify body mechanics/ergonomics to attain remaining goals. []  See Plan of Care  []  See progress note/recertification  []  See Discharge Summary         Progress towards goals / Updated goals:   1. Patient will report FOTO score of 81 or better to indicate significant improvement in functional status. Met 94 3/12/2019  2. Pt will demonstrate full trunk flexion void of pain to improve ease of daily activity. Met- no pain today fingertips to floor 3/26/19  3. Pt will perform QP alternating extension without loss of neutral pelvis to improve core stability with recreational activities.  Improving with notable improvement with proprioceptive input of reformer 3/29/2019; challenged with alternating extension off of Reformer with increased APT noted 4/2/19    PLAN  [x]  Upgrade activities as tolerated     []  Continue plan of care  []  Update interventions per flow sheet       []  Discharge due to:_  []  Other:_      Marvella Fabry, DPT, CMTPT 4/2/2019  4:46 PM    Future Appointments   Date Time Provider Eric Santosi   4/4/2019  4:30 PM Caretha Harada, PT [facility]   4/9/2019  4:30 [facility]   4/11/2019  4:30 PM Kina De Luna [facility]   5/6/2019  3:30 PM Keara Zapata MD [facility]

## 2019-04-04 ENCOUNTER — APPOINTMENT (OUTPATIENT)
Dept: PHYSICAL THERAPY | Age: 34
End: 2019-04-04
Payer: OTHER GOVERNMENT

## 2019-04-09 ENCOUNTER — HOSPITAL ENCOUNTER (OUTPATIENT)
Dept: PHYSICAL THERAPY | Age: 34
Discharge: HOME | End: 2019-04-09
Payer: OTHER GOVERNMENT

## 2019-04-09 PROCEDURE — 97110 THERAPEUTIC EXERCISES: CPT

## 2019-04-09 PROCEDURE — 97112 NEUROMUSCULAR REEDUCATION: CPT

## 2019-04-09 NOTE — PROGRESS NOTES
PT DAILY TREATMENT NOTE 10-18    Patient Name: Dorian Nicolas  Date:2019  : 1985  [x]  Patient  Verified  Payor: MARCOS / Plan: [address] / Product Type:  /    In time:4:39  Out time:5:18  Total Treatment Time (min): 39  Visit #: 5 of 6    Treatment Area: Low back pain [M54.5]    SUBJECTIVE  Pain Level (0-10 scale): 0/10  Any medication changes, allergies to medications, adverse drug reactions, diagnosis change, or new procedure performed?: [x] No    [] Yes (see summary sheet for update)  Subjective functional status/changes:   [] No changes reported  The patient states that his back is a little sore from performing squats, though he states that he went lighter. OBJECTIVE  29 min Therapeutic Exercise:  [x] See flow sheet :   Rationale: increase ROM and increase strength to improve the patients ability to improve ADL ease. 10 min Neuromuscular Re-education:  []  See flow sheet :   Rationale: increase ROM and increase strength  to improve the patients ability to improve ADL ease. With   [] TE   [] TA   [] neuro   [] other: Patient Education: [x] Review HEP    [] Progressed/Changed HEP based on:   [] positioning   [] body mechanics   [] transfers   [] heat/ice application    [] other:      Other Objective/Functional Measures:   Significant improvement maintaining neutral pelvis during session. Pain Level (0-10 scale) post treatment: 0/10    ASSESSMENT/Changes in Function: No pain reported post session. Discussed with patient anticipated D/C at next visit due to progress towards goals. He verbalized understanding.     Patient will continue to benefit from skilled PT services to modify and progress therapeutic interventions, address functional mobility deficits, address ROM deficits, address strength deficits, analyze and address soft tissue restrictions, analyze and cue movement patterns, analyze and modify body mechanics/ergonomics, assess and modify postural abnormalities and instruct in home and community integration to attain remaining goals. []  See Plan of Care  []  See progress note/recertification  []  See Discharge Summary         Progress towards goals / Updated goals:   1. Patient will report FOTO score of 81 or better to indicate significant improvement in functional status. Met 94 3/12/2019  2. Pt will demonstrate full trunk flexion void of pain to improve ease of daily activity. Met- no pain today fingertips to floor 3/26/19  3.  Pt will perform QP alternating extension without loss of neutral pelvis to improve core stability with recreational activities. Improving with notable improvement with proprioceptive input of reformer 3/29/2019; challenged with alternating extension off of Reformer with increased APT noted 4/2/19    PLAN  []  Upgrade activities as tolerated     [x]  Continue plan of care  []  Update interventions per flow sheet       []  Discharge due to:_  []  Other:_      Erika Larry, PT 4/9/2019  5:35 PM    Future Appointments   Date Time Provider [facility]   4/11/2019  4:30 PM Gerard [address] [facility]   5/6/2019  3:30 PM Tessa Ferguson  [address]

## 2019-04-11 ENCOUNTER — HOSPITAL ENCOUNTER (OUTPATIENT)
Dept: PHYSICAL THERAPY | Age: 34
Discharge: HOME | End: 2019-04-11
Payer: OTHER GOVERNMENT

## 2019-04-11 PROCEDURE — 97110 THERAPEUTIC EXERCISES: CPT

## 2019-04-11 PROCEDURE — 97112 NEUROMUSCULAR REEDUCATION: CPT

## 2019-04-11 NOTE — PROGRESS NOTES
PT DISCHARGE DAILY NOTE AND [id number]    Date:2019  Patient name: Jacob Garvey Start of Care: 2019   Referral Tomás Sharp MD : 1985                Medical Diagnosis: Low back pain [M54.5]  Payor:  / Plan: Advaction / Product Type: Joe Gimenez /  Onset Date: 2018   Treatment Diagnosis: mechanical LBP   Prior Hospitalization: see medical history Provider#: [id number]   Medications: Verified on Patient summary List    Comorbidities: unremarkable   Prior Level of Function:  exercising, lifting, running regularly without limitations  Limitations to PLOF: continues to participate in exercise, but some limitations with harder workouts and running/jumping 2/2 back pain  Visits from Start of Care: 12    Missed Visits: 0    Reporting Period : 3/7/2019 to 2019    Date:2019  : 1985  [x]  Patient  Verified  Payor:  / Plan: Advaction / Product Type:  /    In time:4:38  Out time:5:10  Total Treatment Time (min): 32  Visit #: 6 of 6    Medicare/[facility] Only   Total Timed Codes (min):   1:1 Treatment Time:         SUBJECTIVE  Pain Level (0-10 scale): 0  Any medication changes, allergies to medications, adverse drug reactions, diagnosis change, or new procedure performed?: [x] No    [] Yes (see summary sheet for update)  Subjective functional status/changes:   [] No changes reported  Pt reports that he does get a bit of discomfort after prolonged flexion while working but this does subside rather quickly    OBJECTIVE    8 min Therapeutic Exercise:  [] See flow sheet :   Rationale: increase ROM and increase strength to improve the patients ability to perform ADLs    24 min Neuromuscular Re-education:  []  See flow sheet :   Rationale: increase strength, improve coordination and increase proprioception  to improve the patients ability to perform functional tasks with improved anterior chain stability            With   [] TE [] TA   [] neuro   [] other: Patient Education: [x] Review HEP    [] Progressed/Changed HEP based on:   [] positioning   [] body mechanics   [] transfers   [] heat/ice application    [] other:      Other Objective/Functional Measures:      Pain Level (0-10 scale) post treatment: 0    Summary of Care:  1. Patient will report FOTO score of 81 or better to indicate significant improvement in functional status. Met 94 3/12/2019  2. Pt will demonstrate full trunk flexion void of pain to improve ease of daily activity. Met- no pain today fingertips to floor 3/26/19  3. Pt will perform QP alternating extension without loss of neutral pelvis to improve core stability with recreational activities. Improving with notable improvement with proprioceptive input of reformer 3/29/2019; challenged with alternating extension off of Reformer with increased APT noted 4/2/19    ASSESSMENT/Changes in Function: Pt has progressed well at this time in regards to pain and lumbopelvic mechanics.  He has progressed into anterior chain stability work with good tolerance and will D/C to HEP     Thank you for this referral!      PLAN  [x]Discontinue therapy: [x]Patient has reached or is progressing toward set goals      []Patient is non-compliant or has abdicated      []Due to lack of appreciable progress towards set goals    Jorge rFost DPT, CMTPT 4/11/2019  4:48 PM

## 2019-04-30 ENCOUNTER — OFFICE VISIT (OUTPATIENT)
Dept: UROLOGY | Age: 34
End: 2019-04-30

## 2019-04-30 VITALS
HEART RATE: 70 BPM | DIASTOLIC BLOOD PRESSURE: 75 MMHG | HEIGHT: 67 IN | BODY MASS INDEX: 32.18 KG/M2 | WEIGHT: 205 LBS | SYSTOLIC BLOOD PRESSURE: 122 MMHG | OXYGEN SATURATION: 97 %

## 2019-04-30 DIAGNOSIS — N20.0 KIDNEY STONE: Primary | ICD-10-CM

## 2019-04-30 DIAGNOSIS — N20.1 RIGHT URETERAL STONE: ICD-10-CM

## 2019-04-30 LAB
BILIRUB UR QL STRIP: NEGATIVE
GLUCOSE UR-MCNC: NEGATIVE MG/DL
KETONES P FAST UR STRIP-MCNC: NEGATIVE MG/DL
PH UR STRIP: 5.5 [PH] (ref 4.6–8)
PROT UR QL STRIP: NEGATIVE
SP GR UR STRIP: 1.02 (ref 1–1.03)
UA UROBILINOGEN AMB POC: NORMAL (ref 0.2–1)
URINALYSIS CLARITY POC: CLEAR
URINALYSIS COLOR POC: YELLOW
URINE BLOOD POC: NORMAL
URINE LEUKOCYTES POC: NEGATIVE
URINE NITRITES POC: NEGATIVE

## 2019-04-30 NOTE — PROGRESS NOTES
Chief Complaint Patient presents with  New Patient  Kidney Stone HISTORY OF PRESENT ILLNESS:  Dale Abraham is a 29 y.o. male who presents today with a history of having severe right sided renal colic back on Easter Sunday which was April 21. He had a little bit of pain over the next couple days but for the past week is been perfectly free of pain, urinating well with no hematuria fever or chills. I reviewed the CT scan which was performed on that day and this does show about a 4-5 mm stone just below the ureteropelvic junction but with not a great deal of extravasation or hydronephrosis. He has never had a stone before and the scan also shows bilateral calyceal stones. ROS documented Past Medical History:  
Diagnosis Date  Kidney stone  Lactose intolerance History reviewed. No pertinent surgical history. Social History Tobacco Use  Smoking status: Never Smoker  Smokeless tobacco: Never Used Substance Use Topics  Alcohol use: Not Currently Frequency: Never Comment: OCCASIONALLY  Drug use: Never Allergies Allergen Reactions  Lactose Other (comments)  
  intolerance Family History Problem Relation Age of Onset  Hypertension Mother  Hypertension Father No results found for: PSA, Jessica Pronto, PSAR3, [id number], [id number], PSALT, [id number], PSAEXT  
 
 
 
 
PHYSICAL EXAMINATION:  
Visit Vitals /75 (BP 1 Location: Left arm, BP Patient Position: At rest) Pulse 70 Ht 5' 7\" (1.702 m) Wt 205 lb (93 kg) SpO2 97% BMI 32.11 kg/m² Constitutional: WDWN, Pleasant and appropriate affect, No acute distress. CV:  No peripheral swelling noted Respiratory: No respiratory distress or difficulties Abdomen:  No abdominal masses or tenderness. No CVA tenderness. No inguinal hernias noted.  Male:   
Deferred today. Katheryn Garza Skin: No jaundice. Neuro/Psych:  Alert and oriented x 3, affect appropriate. Lymphatic:   No enlarged inguinal lymph nodes. Results for orders placed or performed in visit on 04/30/19 AMB POC URINALYSIS DIP STICK AUTO W/O MICRO Result Value Ref Range Color (UA POC) Yellow Clarity (UA POC) Clear Glucose (UA POC) Negative Negative Bilirubin (UA POC) Negative Negative Ketones (UA POC) Negative Negative Specific gravity (UA POC) 1.025 1.001 - 1.035 Blood (UA POC) 1+ Negative pH (UA POC) 5.5 4.6 - 8.0 Protein (UA POC) Negative Negative Urobilinogen (UA POC) 0.2 mg/dL 0.2 - 1 Nitrites (UA POC) Negative Negative Leukocyte esterase (UA POC) Negative Negative REVIEW OF LABS AND IMAGING:  
 
Imaging Report Reviewed? YES Images Reviewed? YES Other Lab Data Reviewed? YES 
 
 
  
ASSESSMENT:  
  ICD-10-CM ICD-9-CM 1. Kidney stone N20.0 592.0 AMB POC URINALYSIS DIP STICK AUTO W/O MICRO 2. Right ureteral stone N20.1 592.1 PLAN / DISCUSSION: : I have gone over the films with him and shown him the stone that is bothering him now. Since he is completely asymptomatic, I would like to follow this expectantly. I have quoted him a statistic that approximately 80% of people will pass the stone within the next couple of weeks. I would also give him, if he remains asymptomatic, up to a month to pass it. 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 If he is unable to pass a stone I would see him back in a month and probably repeat his x-ray to make sure the stone is still there. At that time if it is, will make arrangements to remove it. The patient expresses understanding and agreement of the discussion and plan. Dl Mayorga MD on 4/30/2019 Please note: 
Voice recognition was used to generate this report, which may have resulted in some phonetic based errors in grammar and contents. Even though attempts were made to correct all the mistakes, some may have been missed, and remained in the body of the document.

## 2019-04-30 NOTE — PROGRESS NOTES
Mr. Jojo Mccabe has a reminder for a \"due or due soon\" health maintenance. I have asked that he contact his primary care provider for follow-up on this health maintenance.

## 2019-04-30 NOTE — PATIENT INSTRUCTIONS
Kidney Stone: Care Instructions Your Care Instructions Kidney stones are formed when salts, minerals, and other substances normally found in the urine clump together. They can be as small as grains of sand or, rarely, as large as golf balls. While the stone is traveling through the ureter, which is the tube that carries urine from the kidney to the bladder, you will probably feel pain. The pain may be mild or very severe. You may also have some blood in your urine. As soon as the stone reaches the bladder, any intense pain should go away. If a stone is too large to pass on its own, you may need a medical procedure to help you pass the stone. The doctor has checked you carefully, but problems can develop later. If you notice any problems or new symptoms, get medical treatment right away. Follow-up care is a key part of your treatment and safety. Be sure to make and go to all appointments, and call your doctor if you are having problems. It's also a good idea to know your test results and keep a list of the medicines you take. How can you care for yourself at home? · Drink plenty of fluids, enough so that your urine is light yellow or clear like water. If you have kidney, heart, or liver disease and have to limit fluids, talk with your doctor before you increase the amount of fluids you drink. · Take pain medicines exactly as directed. Call your doctor if you think you are having a problem with your medicine. ? If the doctor gave you a prescription medicine for pain, take it as prescribed. ? If you are not taking a prescription pain medicine, ask your doctor if you can take an over-the-counter medicine. Read and follow all instructions on the label. · Your doctor may ask you to strain your urine so that you can collect your kidney stone when it passes. You can use a kitchen strainer or a tea strainer to catch the stone. Store it in a plastic bag until you see your doctor again. Preventing future kidney stones Some changes in your diet may help prevent kidney stones. Depending on the cause of your stones, your doctor may recommend that you: · Drink plenty of fluids, enough so that your urine is light yellow or clear like water. If you have kidney, heart, or liver disease and have to limit fluids, talk with your doctor before you increase the amount of fluids you drink. · Limit coffee, tea, and alcohol. Also avoid grapefruit juice. · Do not take more than the recommended daily dose of vitamins C and D. 
· Avoid antacids such as Gaviscon, Maalox, Mylanta, or Tums. · Limit the amount of salt (sodium) in your diet. · Eat a balanced diet that is not too high in protein. · Limit foods that are high in a substance called oxalate, which can cause kidney stones. These foods include dark green vegetables, rhubarb, chocolate, wheat bran, nuts, cranberries, and beans. When should you call for help? Call your doctor now or seek immediate medical care if: 
  · You cannot keep down fluids.  
  · Your pain gets worse.  
  · You have a fever or chills.  
  · You have new or worse pain in your back just below your rib cage (the flank area).  
  · You have new or more blood in your urine.  
 Watch closely for changes in your health, and be sure to contact your doctor if: 
  · You do not get better as expected. Where can you learn more? Go to http://yesenia-handy.info/. Enter Z336 in the search box to learn more about \"Kidney Stone: Care Instructions. \" Current as of: March 14, 2018 Content Version: 11.9 © 3106-6906 [employer]. Care instructions adapted under license by [employer] (which disclaims liability or warranty for this information). If you have questions about a medical condition or this instruction, always ask your healthcare professional. [address] any warranty or liability for your use of this information.

## 2019-05-23 ENCOUNTER — OFFICE VISIT (OUTPATIENT)
Dept: ORTHOPEDIC SURGERY | Age: 34
End: 2019-05-23

## 2019-05-23 VITALS
RESPIRATION RATE: 18 BRPM | HEART RATE: 78 BPM | HEIGHT: 67 IN | DIASTOLIC BLOOD PRESSURE: 90 MMHG | WEIGHT: 204 LBS | TEMPERATURE: 97.9 F | BODY MASS INDEX: 32.02 KG/M2 | SYSTOLIC BLOOD PRESSURE: 143 MMHG

## 2019-05-23 DIAGNOSIS — M54.59 MECHANICAL LOW BACK PAIN: Primary | ICD-10-CM

## 2019-05-23 DIAGNOSIS — M79.18 MYOFASCIAL PAIN: ICD-10-CM

## 2019-05-23 NOTE — PROGRESS NOTES
[address]  [address], [address]  [place]  Phone: [phone]  Fax: [phone]        Meri Carver  : 1985  PCP: Lilly Almanza PA-C  2019    PROGRESS NOTE      HISTORY OF PRESENT ILLNESS  Emily Arauz is a 29 y.o. male. Tiff Salguero in to the office today c/o low back pain following a fall at work when he slipped on some ice landing on his back in 2018. He works for the [email or web address]. He denies any radicular symptoms. He has been in PT for the last 5 weeks ([facility]), and he is unsure as to whether it has been beneficial. His pain is worse after prolonged standing or sitting, but is alleviated with walking or moving. He is still able to run without pain. He continues to do the [employer] workout series, but it exacerbates his pain some. He has been using ice as a modality. He had a slight delay in scheduling PT due to insurance, but he is finding some relief from the PT and dry needling. He notes that his pain level remains at about the same level, but he is no longer experiencing the muscle spasms in his back. 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 He is interested in discussing the option of chiropractic care. Emily Arauz comes in to the office today for f/u. He states that he is maintaining well with completion of PT and maintaining an HEP of exercises from PT and avoiding activities that generally aggravate his pain. He notes that he had 3 sessions of dry needling, but he did not tolerate it well. I advised he begin incorporating more activities into his HEP like beginning to add weighted squats back into his HEP as tolerated. He has h/o kidney stones, and is scheduled to f/u with a urologist next week. He rates his pain as a 0/10 today. ASSESSMENT  His pain remains myofascial in nature. PLAN  1. Discussed incorporating low bar squat or dead lifts into HEP.    2. We may consider trigger point injections if his pain returns. Pt will f/u PRN or sooner as needed. Diagnoses and all orders for this visit:    1. Mechanical low back pain    2. Myofascial pain               PAST MEDICAL HISTORY   Past Medical History:   Diagnosis Date    Kidney stone     Lactose intolerance        No past surgical history on file. Zev Hernandez MEDICATIONS         ALLERGIES  Allergies   Allergen Reactions    Lactose Other (comments)     intolerance          SOCIAL HISTORY    Social History     Socioeconomic History    Marital status: SINGLE     Spouse name: Not on file    Number of children: Not on file    Years of education: Not on file    Highest education level: Not on file   Tobacco Use    Smoking status: Never Smoker    Smokeless tobacco: Never Used   Substance and Sexual Activity    Alcohol use: Not Currently     Frequency: Never     Comment: OCCASIONALLY    Drug use: Never    Sexual activity: Yes   Other Topics Concern       FAMILY HISTORY  Family History   Problem Relation Age of Onset    Hypertension Mother     Hypertension Father          REVIEW OF SYSTEMS  Review of Systems   Musculoskeletal: Positive for back pain. PHYSICAL EXAMINATION  There were no vitals taken for this visit. Pain Assessment  3/11/2019   Location of Pain Back   Severity of Pain 2   Quality of Pain -   Duration of Pain Persistent   Frequency of Pain Constant   Aggravating Factors -   Limiting Behavior No   Relieving Factors Rest   Result of Injury No   Work-Related Injury No   Type of Injury Slip           Constitutional:  Well developed, well nourished, in no acute distress. Psychiatric: Affect and mood are appropriate. Integumentary: No rashes or abrasions noted on exposed areas. SPINE/MUSCULOSKELETAL EXAM    Cervical spine:  Neck is midline. Normal muscle tone. No focal atrophy is noted. ROM pain free. Shoulder ROM intact.   No tenderness to palpation. Negative Spurling's sign. Negative Tinel's sign. Negative Farah's sign.      Sensation in the bilateral arms grossly intact to light touch.      Lumbar spine:  No rash, ecchymosis, or gross obliquity. No fasciculations. No focal atrophy is noted. No pain with hip ROM. Full range of motion. Tenderness to palpation of lumbar paraspinals. No tenderness to palpation at the sciatic notch. SI joints non-tender. Trochanters non tender.     Sensation in the bilateral legs grossly intact to light touch.     No directional preference. MOTOR:      Biceps  Triceps Deltoids Wrist Ext Wrist Flex Hand Intrin   Right 5/5 5/5 5/5 5/5 5/5 5/5   Left 5/5 5/5 5/5 5/5 5/5 5/5             Hip Flex  Quads Hamstrings Ankle DF EHL Ankle PF   Right 5/5 5/5 5/5 5/5 5/5 5/5   Left 5/5 5/5 5/5 5/5 5/5 5/5     DTRs are 2+ biceps, triceps, brachioradialis, patella, and Achilles.     Negative Straight Leg raise. Squat not tested. No difficulty with tandem gait.      Ambulation without assistive device. FWB.       RADIOGRAPHS  Lumbar MRI images taken on 12/5/18 personally reviewed with patient:  FINDINGS:     General: Ambiguous vertebral segmentation. For interpretation purposes, L5 will  be considered partially sacralized, left more than right with pseudoarticulation  of the left L5-S1 lateral masses. Vertebral body heights are preserved. Above  L5, disc space heights are preserved. No focal listhesis. Lumbar lordosis  maintained. Conus ends at level L1. No abnormal signal in the distal cord. No  abnormal epidural collection identified. No suspicious marrow signal.     Miscellaneous: No incidental acute or suspicious findings outside of the lumbar  spine.     Levels:     T11-T12: Evaluated sagittal plane only.  No significant degenerative change or  stenosis.     T12-L1: No significant degenerative change or stenosis.     L1-L2: No significant degenerative change or stenosis.     L2-L3: No significant degenerative change or stenosis.     L3-L4: No significant degenerative change or stenosis.     L4-L5: Minimal disc bulge. Mild facet arthropathy. Spinal canal patent. Minimal  foraminal narrowing.     L5-S1: Transitional level. No significant disc disease. Spinal canal patent. Mild left foraminal stenosis related to bony prominence of the left L5-S1  lateral mass pseudoarticulation. Right foramen patent.     Imaged sacrum: Unremarkable.     IMPRESSION  IMPRESSION:     1. No acute findings. No critical stenosis. Overall mild degenerative changes  detailed level by level above.     2. Ambiguous vertebral segmentation. L5 is considered partially lumbarized for  interpretation purposes. -At the L5-S1 transitional level there is mild left foraminal stenosis related  to bony prominence of the left L5-S1 lateral mass pseudoarticulation complex. 14 minutes of face-to-face contact were spent with the patient during today's visit extensively discussing symptoms and treatment plan. All questions were answered. More than half of this visit today was spent on counseling.      Written by Kadeem Rawls as dictated by Joshua Caldera MD

## 2019-05-28 ENCOUNTER — OFFICE VISIT (OUTPATIENT)
Dept: UROLOGY | Age: 34
End: 2019-05-28

## 2019-05-28 VITALS
DIASTOLIC BLOOD PRESSURE: 94 MMHG | SYSTOLIC BLOOD PRESSURE: 137 MMHG | OXYGEN SATURATION: 98 % | HEIGHT: 67 IN | BODY MASS INDEX: 32.02 KG/M2 | WEIGHT: 204 LBS | HEART RATE: 71 BPM

## 2019-05-28 DIAGNOSIS — N20.0 KIDNEY STONE: Primary | ICD-10-CM

## 2019-05-28 LAB
BILIRUB UR QL STRIP: NEGATIVE
GLUCOSE UR-MCNC: NEGATIVE MG/DL
KETONES P FAST UR STRIP-MCNC: NEGATIVE MG/DL
PH UR STRIP: 7 [PH] (ref 4.6–8)
PROT UR QL STRIP: NEGATIVE
SP GR UR STRIP: 1.03 (ref 1–1.03)
UA UROBILINOGEN AMB POC: NORMAL (ref 0.2–1)
URINALYSIS CLARITY POC: CLEAR
URINALYSIS COLOR POC: YELLOW
URINE BLOOD POC: NEGATIVE
URINE LEUKOCYTES POC: NEGATIVE
URINE NITRITES POC: NEGATIVE

## 2019-05-28 NOTE — PATIENT INSTRUCTIONS
Kidney Stone: Care Instructions  Your Care Instructions    Kidney stones are formed when salts, minerals, and other substances normally found in the urine clump together. They can be as small as grains of sand or, rarely, as large as golf balls. While the stone is traveling through the ureter, which is the tube that carries urine from the kidney to the bladder, you will probably feel pain. The pain may be mild or very severe. You may also have some blood in your urine. As soon as the stone reaches the bladder, any intense pain should go away. If a stone is too large to pass on its own, you may need a medical procedure to help you pass the stone. The doctor has checked you carefully, but problems can develop later. If you notice any problems or new symptoms, get medical treatment right away. Follow-up care is a key part of your treatment and safety. Be sure to make and go to all appointments, and call your doctor if you are having problems. It's also a good idea to know your test results and keep a list of the medicines you take. How can you care for yourself at home? · Drink plenty of fluids, enough so that your urine is light yellow or clear like water. If you have kidney, heart, or liver disease and have to limit fluids, talk with your doctor before you increase the amount of fluids you drink. · Take pain medicines exactly as directed. Call your doctor if you think you are having a problem with your medicine. ? If the doctor gave you a prescription medicine for pain, take it as prescribed. ? If you are not taking a prescription pain medicine, ask your doctor if you can take an over-the-counter medicine. Read and follow all instructions on the label. · Your doctor may ask you to strain your urine so that you can collect your kidney stone when it passes. You can use a kitchen strainer or a tea strainer to catch the stone. Store it in a plastic bag until you see your doctor again.   Preventing future kidney stones  Some changes in your diet may help prevent kidney stones. Depending on the cause of your stones, your doctor may recommend that you:  · Drink plenty of fluids, enough so that your urine is light yellow or clear like water. If you have kidney, heart, or liver disease and have to limit fluids, talk with your doctor before you increase the amount of fluids you drink. · Limit coffee, tea, and alcohol. Also avoid grapefruit juice. · Do not take more than the recommended daily dose of vitamins C and D.  · Avoid antacids such as Gaviscon, Maalox, Mylanta, or Tums. · Limit the amount of salt (sodium) in your diet. · Eat a balanced diet that is not too high in protein. · Limit foods that are high in a substance called oxalate, which can cause kidney stones. These foods include dark green vegetables, rhubarb, chocolate, wheat bran, nuts, cranberries, and beans. When should you call for help? Call your doctor now or seek immediate medical care if:    · You cannot keep down fluids.     · Your pain gets worse.     · You have a fever or chills.     · You have new or worse pain in your back just below your rib cage (the flank area).     · You have new or more blood in your urine.    Watch closely for changes in your health, and be sure to contact your doctor if:    · You do not get better as expected. Where can you learn more? Go to http://yesenia-handy.info/. Enter C872 in the search box to learn more about \"Kidney Stone: Care Instructions. \"  Current as of: March 14, 2018  Content Version: 11.9  © 3179-2905 [employer]. Care instructions adapted under license by [employer] (which disclaims liability or warranty for this information). If you have questions about a medical condition or this instruction, always ask your healthcare professional. [address] any warranty or liability for your use of this information.

## 2019-05-28 NOTE — PROGRESS NOTES
Mr. John Holliday has a reminder for a \"due or due soon\" health maintenance. I have asked that he contact his primary care provider for follow-up on this health maintenance.

## 2019-05-28 NOTE — PROGRESS NOTES
Chief Complaint   Patient presents with    Kidney Stone       HISTORY OF PRESENT ILLNESS:  Sophie Infante is a 29 y.o. male who presents today in follow-up to a kidney stone started bothering him in mid April. This was a 4 to 5 mm stone just below the UPJ on the right but there was no significant extravasation or hydronephrosis. When he saw me he was having no pain and he has not had any pain since mid April so that is going on 6 weeks now. He has never passed the stone knowingly. His urine is free of blood and is been completely asymptomatic doing his normal activities which involves [employer] duty. ROS documented on the chart    Past Medical History:   Diagnosis Date    Kidney stone     Lactose intolerance        History reviewed. No pertinent surgical history. Social History     Tobacco Use    Smoking status: Never Smoker    Smokeless tobacco: Never Used   Substance Use Topics    Alcohol use: Not Currently     Frequency: Never     Comment: OCCASIONALLY    Drug use: Never       Allergies   Allergen Reactions    Lactose Other (comments)     intolerance       Family History   Problem Relation Age of Onset    Hypertension Mother     Hypertension Father            No results found for: PSA, Burlene Lisa, PSAR3, [id number], [id number], PSALT, 70257, PSAEXT           PHYSICAL EXAMINATION:   Visit Vitals  BP (!) 137/94 (BP 1 Location: Left arm, BP Patient Position: Sitting)   Pulse 71   Ht 5' 7\" (1.702 m)   Wt 204 lb (92.5 kg)   SpO2 98%   BMI 31.95 kg/m²     Constitutional: WDWN, Pleasant and appropriate affect, No acute distress. CV:  No peripheral swelling noted  Respiratory: No respiratory distress or difficulties  Abdomen:  No abdominal masses or tenderness. No CVA tenderness. No inguinal hernias noted.  Male: This is deferred today. Skin: No jaundice. Neuro/Psych:  Alert and oriented x 3, affect appropriate.    Lymphatic:   No enlarged inguinal lymph nodes. Results for orders placed or performed in visit on 05/28/19   AMB POC URINALYSIS DIP STICK AUTO W/O MICRO   Result Value Ref Range    Color (UA POC) Yellow     Clarity (UA POC) Clear     Glucose (UA POC) Negative Negative    Bilirubin (UA POC) Negative Negative    Ketones (UA POC) Negative Negative    Specific gravity (UA POC) 1.030 1.001 - 1.035    Blood (UA POC) Negative Negative    pH (UA POC) 7.0 4.6 - 8.0    Protein (UA POC) Negative Negative    Urobilinogen (UA POC) 0.2 mg/dL 0.2 - 1    Nitrites (UA POC) Negative Negative    Leukocyte esterase (UA POC) Negative Negative         REVIEW OF LABS AND IMAGING:     Imaging Report Reviewed? YES     Images Reviewed? NO           Other Lab Data Reviewed? YES         ASSESSMENT:     ICD-10-CM ICD-9-CM    1. Kidney stone N20.0 592.0 AMB POC URINALYSIS DIP STICK AUTO W/O MICRO            PLAN / DISCUSSION: : He has bilateral calyceal stones and the stone on the right is the one that is bothering him. However since he has been totally asymptomatic for 6 weeks, I am going to repeat a KUB and if that is completely normal will treat him expectantly. I will assume the stone has passed. the patient expresses understanding and agreement of the discussion and plan. Clifton Hager MD on 5/28/2019         Please note:  Voice recognition was used to generate this report, which may have resulted in some phonetic based errors in grammar and contents. Even though attempts were made to correct all the mistakes, some may have been missed, and remained in the body of the document.

## 2019-05-29 ENCOUNTER — HOSPITAL ENCOUNTER (OUTPATIENT)
Dept: GENERAL RADIOLOGY | Age: 34
Discharge: HOME | End: 2019-05-29
Payer: OTHER GOVERNMENT

## 2019-05-29 DIAGNOSIS — N20.0 KIDNEY STONE: ICD-10-CM

## 2019-05-29 PROCEDURE — 74018 RADEX ABDOMEN 1 VIEW: CPT

## 2019-06-11 ENCOUNTER — DOCUMENTATION ONLY (OUTPATIENT)
Dept: UROLOGY | Age: 34
End: 2019-06-11

## 2019-06-12 ENCOUNTER — DOCUMENTATION ONLY (OUTPATIENT)
Dept: UROLOGY | Age: 34
End: 2019-06-12

## 2019-06-12 NOTE — PROGRESS NOTES
Patient given an appointment to see Dr. Miguel Ángel Sherman on July 1, 2019. He will call if he develops pain in the mean time. Patient expressed understanding and agrees.

## 2019-07-01 ENCOUNTER — OFFICE VISIT (OUTPATIENT)
Dept: UROLOGY | Age: 34
End: 2019-07-01

## 2019-07-01 VITALS
HEIGHT: 67 IN | DIASTOLIC BLOOD PRESSURE: 77 MMHG | HEART RATE: 71 BPM | WEIGHT: 204 LBS | OXYGEN SATURATION: 97 % | BODY MASS INDEX: 32.02 KG/M2 | SYSTOLIC BLOOD PRESSURE: 114 MMHG

## 2019-07-01 DIAGNOSIS — N20.0 KIDNEY STONE: Primary | ICD-10-CM

## 2019-07-01 LAB
BILIRUB UR QL STRIP: NEGATIVE
GLUCOSE UR-MCNC: NEGATIVE MG/DL
KETONES P FAST UR STRIP-MCNC: NEGATIVE MG/DL
PH UR STRIP: 6 [PH] (ref 4.6–8)
PROT UR QL STRIP: NEGATIVE
SP GR UR STRIP: 1.02 (ref 1–1.03)
UA UROBILINOGEN AMB POC: NORMAL (ref 0.2–1)
URINALYSIS CLARITY POC: CLEAR
URINALYSIS COLOR POC: YELLOW
URINE BLOOD POC: NORMAL
URINE LEUKOCYTES POC: NEGATIVE
URINE NITRITES POC: NEGATIVE

## 2019-07-01 NOTE — PATIENT INSTRUCTIONS
Learning About Diet for Kidney Stone Prevention  What are kidney stones? Kidney stones are made of salts and minerals in the urine that form small \"erick. \" Stones can form in the kidneys and the ureters (the tubes that lead from the kidneys to the bladder). They can also form in the bladder. Stones may not cause a problem as long as they stay in the kidneys. But they can cause sudden, severe pain. Pain is most likely when the stones travel from the kidneys to the bladder. Kidney stones can cause bloody urine. Kidney stones often run in families. You are more likely to get them if you don't drink enough fluids, mainly water. Certain foods and drinks and some dietary supplements may also increase your risk for kidney stones if you consume too much of them. What can you do to prevent kidney stones? Changing what you eat may not prevent all types of kidney stones. But for people who have a history of certain kinds of kidney stones, some changes in diet may help. A dietitian can help you set up a meal plan that includes healthy, low-oxalate choices. Here are some general guidelines to get you started. Plan your meals and snacks around foods that are low in oxalate. These foods include:  · Corn, kale, parsnips, and squash,. · Beef, chicken, pork, turkey, and fish. · Milk, butter, cheese, and yogurt. You can eat certain foods that are medium-high in oxalate, but eat them only once in a while. These foods include:  · Bread. · Brown rice. · English muffins. · Figs. · Popcorn. · String beans. · Tomatoes. Limit very high-oxalate foods, including:  · Black tea. · Coffee. · Chocolate. · Dark green vegetables. · Nuts. Here are some other things you can do to help prevent kidney stones. · Drink plenty of fluids. If you have kidney, heart, or liver disease and have to limit fluids, talk with your doctor before you increase the amount of fluids you drink.   · Do not take more than the recommended daily dose of vitamins C and D.  · Limit the salt in your diet. · Eat a balanced diet that is not too high in protein. Follow-up care is a key part of your treatment and safety. Be sure to make and go to all appointments, and call your doctor if you are having problems. It's also a good idea to know your test results and keep a list of the medicines you take. Where can you learn more? Go to http://yesenia-handy.info/. Enter C138 in the search box to learn more about \"Learning About Diet for Kidney Stone Prevention. \"  Current as of: March 28, 2018  Content Version: 11.9  © 2585-4094 [employer], [employer]. Care instructions adapted under license by [employer] (which disclaims liability or warranty for this information). If you have questions about a medical condition or this instruction, always ask your healthcare professional. [address] any warranty or liability for your use of this information.

## 2019-07-01 NOTE — PROGRESS NOTES
Chief Complaint   Patient presents with    Kidney Stone     after KUB       HISTORY OF PRESENT ILLNESS:  Keanu Montero is a 29 y.o. male who presents today in follow-up to a right renal calculus. In summary back in April of this year he had severe right renal colic typical of a stone and a CT at that time reportedly showed a stone just below the UPJ on the right. Following this he had very little stone pain and he never passed the stone. A subsequent KUB in late May showed the stone to be have moved back into the kidney. A further follow-up revealed no further pain. 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 I think under those circumstances I would like to see him back later this fall, around August or September and plan on ESWL for that right renal stone. ROS    Past Medical History:   Diagnosis Date    Kidney stone     Lactose intolerance        History reviewed. No pertinent surgical history. Social History     Tobacco Use    Smoking status: Never Smoker    Smokeless tobacco: Never Used   Substance Use Topics    Alcohol use: Not Currently     Frequency: Never     Comment: OCCASIONALLY    Drug use: Never       Allergies   Allergen Reactions    Lactose Other (comments)     intolerance       Family History   Problem Relation Age of Onset    Hypertension Mother     Hypertension Father            No results found for: PSA, Shanna Freeze, PSAR3, [id number], [id number], PSALT, 04545, PSAEXT           PHYSICAL EXAMINATION:   Visit Vitals  /77 (BP 1 Location: Left arm, BP Patient Position: Sitting)   Pulse 71   Ht 5' 7\" (1.702 m)   Wt 204 lb (92.5 kg)   SpO2 97%   BMI 31.95 kg/m²     Constitutional: WDWN, Pleasant and appropriate affect, No acute distress.     CV:  No peripheral swelling noted  Respiratory: No respiratory distress or difficulties  Abdomen:  No abdominal masses or tenderness. No CVA tenderness. No inguinal hernias noted.  Male:    Deferred today. Skin: No jaundice. Neuro/Psych:  Alert and oriented x 3, affect appropriate. Lymphatic:   No enlarged inguinal lymph nodes. Results for orders placed or performed in visit on 07/01/19   AMB POC URINALYSIS DIP STICK AUTO W/O MICRO   Result Value Ref Range    Color (UA POC) Yellow     Clarity (UA POC) Clear     Glucose (UA POC) Negative Negative    Bilirubin (UA POC) Negative Negative    Ketones (UA POC) Negative Negative    Specific gravity (UA POC) 1.020 1.001 - 1.035    Blood (UA POC) 1+ Negative    pH (UA POC) 6.0 4.6 - 8.0    Protein (UA POC) Negative Negative    Urobilinogen (UA POC) 0.2 mg/dL 0.2 - 1    Nitrites (UA POC) Negative Negative    Leukocyte esterase (UA POC) Negative Negative         REVIEW OF LABS AND IMAGING:     Imaging Report Reviewed? YES     Images Reviewed? YES           Other Lab Data Reviewed? YES         ASSESSMENT:     ICD-10-CM ICD-9-CM    1. Kidney stone N20.0 592.0 AMB POC URINALYSIS DIP STICK AUTO W/O MICRO            PLAN / DISCUSSION: : At the present time he is comfortable and not engaged in any significant travel. However if he is going to go to see for a number of months next year, I think he needs that stone treated before he does that. I will see him back later this fall to try and treat that before the year ends. The patient expresses understanding and agreement of the discussion and plan. Cassie Carrera MD on 7/1/2019         Please note:  Voice recognition was used to generate this report, which may have resulted in some phonetic based errors in grammar and contents. Even though attempts were made to correct all the mistakes, some may have been missed, and remained in the body of the document.

## 2019-07-01 NOTE — PROGRESS NOTES
Mr. Deborah Weeks has a reminder for a \"due or due soon\" health maintenance. I have asked that he contact his primary care provider for follow-up on this health maintenance.

## 2019-10-14 ENCOUNTER — OFFICE VISIT (OUTPATIENT)
Dept: UROLOGY | Age: 34
End: 2019-10-14

## 2019-10-14 VITALS
DIASTOLIC BLOOD PRESSURE: 76 MMHG | OXYGEN SATURATION: 96 % | WEIGHT: 205 LBS | BODY MASS INDEX: 32.18 KG/M2 | HEIGHT: 67 IN | HEART RATE: 86 BPM | SYSTOLIC BLOOD PRESSURE: 116 MMHG

## 2019-10-14 DIAGNOSIS — N20.0 KIDNEY STONE: Primary | ICD-10-CM

## 2019-10-14 NOTE — PROGRESS NOTES
Chief Complaint   Patient presents with    Kidney Stone       HISTORY OF PRESENT ILLNESS:  Jennifer Alicea is a 29 y.o. male who presents today in follow-up to a right renal stone. He had his original CT at the [address] back in April 2019. At this time the stone was at the UPJ. Subsequent to this the stone migrated back into the kidney and other than mild discomfort from time to time has not bothered him since that time. A KUB shows the stone to still be a calyx of that kidney. I had originally thought that the stone is larger but it measures slightly less than 5 mm on this particular KUB. 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 I have spoken with Dr. Esteban Soto about this and he has agreed to see him for further evaluation and treatment. .           ROS documented on the chart  Past Medical History:   Diagnosis Date    Kidney stone     Lactose intolerance        History reviewed. No pertinent surgical history.     Social History     Tobacco Use    Smoking status: Never Smoker    Smokeless tobacco: Never Used   Substance Use Topics    Alcohol use: Not Currently     Frequency: Never     Comment: OCCASIONALLY    Drug use: Never       Allergies   Allergen Reactions    Lactose Other (comments)     intolerance       Family History   Problem Relation Age of Onset    Hypertension Mother     Hypertension Father            No results found for: PSA, Amye Pulse, PSAR3, [id number], [id number], PSALT, 75510, PSAEXT           PHYSICAL EXAMINATION:   Visit Vitals  /76 (BP 1 Location: Left arm, BP Patient Position: Sitting)   Pulse 86   Ht 5' 7\" (1.702 m)   Wt 205 lb (93 kg)   SpO2 96%   BMI 32.11 kg/m²     Constitutional: WDWN, Pleasant and appropriate affect, No acute distress. CV:  No peripheral swelling noted  Respiratory: No respiratory distress or difficulties  Abdomen:  No abdominal masses or tenderness. No CVA tenderness. No inguinal hernias noted.  Male:    Deferred today   skin: No jaundice. Neuro/Psych:  Alert and oriented x 3, affect appropriate. Lymphatic:   No enlarged inguinal lymph nodes. Results for orders placed or performed in visit on 07/01/19   AMB POC URINALYSIS DIP STICK AUTO W/O MICRO   Result Value Ref Range    Color (UA POC) Yellow     Clarity (UA POC) Clear     Glucose (UA POC) Negative Negative    Bilirubin (UA POC) Negative Negative    Ketones (UA POC) Negative Negative    Specific gravity (UA POC) 1.020 1.001 - 1.035    Blood (UA POC) 1+ Negative    pH (UA POC) 6.0 4.6 - 8.0    Protein (UA POC) Negative Negative    Urobilinogen (UA POC) 0.2 mg/dL 0.2 - 1    Nitrites (UA POC) Negative Negative    Leukocyte esterase (UA POC) Negative Negative         REVIEW OF LABS AND IMAGING:     Imaging Report Reviewed? YES     Images Reviewed? YES           Other Lab Data Reviewed? YES         ASSESSMENT:     ICD-10-CM ICD-9-CM    1. Kidney stone N20.0 592.0 AMB POC URINALYSIS DIP STICK AUTO W/O MICRO            PLAN / DISCUSSION: : I think he needs to have the treatment of the stone even though it presently it is asymptomatic. I had originally intended to use lithotripsy but that is no longer option through this office. 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 I will asked Dr. Amilcar Wagner to evaluate him sometime between now and the first of the year. The patient expresses understanding and agreement of the discussion and plan.     Chris Tinajero MD on 10/14/2019         Please note:  Voice recognition was used to generate this report, which may have resulted in some phonetic based errors in grammar and contents. Even though attempts were made to correct all the mistakes, some may have been missed, and remained in the body of the document.

## 2019-10-14 NOTE — PATIENT INSTRUCTIONS
Learning About Diet for Kidney Stone Prevention  What are kidney stones? Kidney stones are made of salts and minerals in the urine that form small \"erick. \" Stones can form in the kidneys and the ureters (the tubes that lead from the kidneys to the bladder). They can also form in the bladder. Stones may not cause a problem as long as they stay in the kidneys. But they can cause sudden, severe pain. Pain is most likely when the stones travel from the kidneys to the bladder. Kidney stones can cause bloody urine. Kidney stones often run in families. You are more likely to get them if you don't drink enough fluids, mainly water. Certain foods and drinks and some dietary supplements may also increase your risk for kidney stones if you consume too much of them. What can you do to prevent kidney stones? Changing what you eat may not prevent all types of kidney stones. But for people who have a history of certain kinds of kidney stones, some changes in diet may help. A dietitian can help you set up a meal plan that includes healthy, low-oxalate choices. Here are some general guidelines to get you started. Plan your meals and snacks around foods that are low in oxalate. These foods include:  · Corn, kale, parsnips, and squash,. · Beef, chicken, pork, turkey, and fish. · Milk, butter, cheese, and yogurt. You can eat certain foods that are medium-high in oxalate, but eat them only once in a while. These foods include:  · Bread. · Brown rice. · English muffins. · Figs. · Popcorn. · String beans. · Tomatoes. Limit very high-oxalate foods, including:  · Black tea. · Coffee. · Chocolate. · Dark green vegetables. · Nuts. Here are some other things you can do to help prevent kidney stones. · Drink plenty of fluids. If you have kidney, heart, or liver disease and have to limit fluids, talk with your doctor before you increase the amount of fluids you drink.   · Do not take more than the recommended daily dose of vitamins C and D.  · Limit the salt in your diet. · Eat a balanced diet that is not too high in protein. Follow-up care is a key part of your treatment and safety. Be sure to make and go to all appointments, and call your doctor if you are having problems. It's also a good idea to know your test results and keep a list of the medicines you take. Where can you learn more? Go to http://yesenia-handy.info/. Enter C138 in the search box to learn more about \"Learning About Diet for Kidney Stone Prevention. \"  Current as of: November 7, 2018  Content Version: 12.2  © 7526-1617 [employer], [employer]. Care instructions adapted under license by [employer] (which disclaims liability or warranty for this information). If you have questions about a medical condition or this instruction, always ask your healthcare professional. [address] any warranty or liability for your use of this information.

## 2019-10-14 NOTE — PROGRESS NOTES
MrFord Johnson Leialu has a reminder for a \"due or due soon\" health maintenance. I have asked that he contact his primary care provider for follow-up on this health maintenance.